# Patient Record
Sex: FEMALE | Race: WHITE | NOT HISPANIC OR LATINO | Employment: FULL TIME | URBAN - METROPOLITAN AREA
[De-identification: names, ages, dates, MRNs, and addresses within clinical notes are randomized per-mention and may not be internally consistent; named-entity substitution may affect disease eponyms.]

---

## 2017-02-14 ENCOUNTER — HOSPITAL ENCOUNTER (EMERGENCY)
Facility: CLINIC | Age: 47
Discharge: HOME | End: 2017-02-14
Attending: EMERGENCY MEDICINE | Admitting: EMERGENCY MEDICINE
Payer: COMMERCIAL

## 2017-02-14 VITALS
RESPIRATION RATE: 18 BRPM | HEART RATE: 76 BPM | DIASTOLIC BLOOD PRESSURE: 98 MMHG | SYSTOLIC BLOOD PRESSURE: 137 MMHG | OXYGEN SATURATION: 95 %

## 2017-02-14 DIAGNOSIS — S61.011A LACERATION OF RIGHT THUMB, INITIAL ENCOUNTER: ICD-10-CM

## 2017-02-14 PROCEDURE — 12001 RPR S/N/AX/GEN/TRNK 2.5CM/<: CPT

## 2017-02-14 PROCEDURE — 99283 EMERGENCY DEPT VISIT LOW MDM: CPT

## 2017-02-14 ASSESSMENT — ENCOUNTER SYMPTOMS: WOUND: 1

## 2017-02-14 NOTE — ED AVS SNAPSHOT
Emergency Department    [address] [facility] [address]    Phone:  [phone]    Fax:  [phone]                                       Debi Burciaga   MRN: [id number]    Department:   Emergency Department   Date of Visit:  2/14/2017           Patient Information     Date Of Birth          1970        Your diagnoses for this visit were:     Laceration of right thumb, initial encounter        You were seen by Cindy Narayanan MD.      Follow-up Information     Follow up with  Emergency Department.    Specialty:  EMERGENCY MEDICINE    Why:  or your Primary clinic for suture removal in 8-10 days.    Contact information:    [address] [facility] [address] [phone]        Discharge Instructions       Have stitches removed in 8-10 days.    Discharge References/Attachments     LACERATION, EXTREMITY: SUTURE, STAPLE, OR TAPE (ENGLISH)      24 Hour Appointment Hotline       To make an appointment at any [facility], call [phone] ([phone]). If you don't have a family doctor or clinic, we will help you find one. [facility] clinics are conveniently located to serve the needs of you and your family.             Review of your medicines      Our records show that you are taking the medicines listed below. If these are incorrect, please call your family doctor or clinic.        Dose / Directions Last dose taken    AMLODIPINE BESYLATE PO   Dose:  5 mg        Take 5 mg by mouth every morning.   Refills:  0        HYDROCHLOROTHIAZIDE PO   Dose:  25 mg        Take 25 mg by mouth every morning.   Refills:  0        * ibuprofen 600 MG tablet   Commonly known as:  ADVIL/MOTRIN   Dose:  600 mg   Quantity:  30 tablet        Take 1 tablet by mouth every 6 hours as needed for other (For mild pain and temperature greater than 102F).   Refills:  0        * ibuprofen 600 MG tablet   Commonly known as:  ADVIL/MOTRIN   Dose:  600 mg   Quantity:  30 tablet         Take 1 tablet by mouth every 6 hours as needed for other (For mild pain or temperature greater than 102F).   Refills:  0        Krill Oil 1000 MG Caps   Dose:  1000 mg        Take 1,000 mg by mouth daily.   Refills:  0        Multi-vitamin Tabs tablet   Dose:  1 tablet   Generic drug:  multivitamin, therapeutic with minerals        Take 1 tablet by mouth daily.   Refills:  0        ondansetron 4 MG tablet   Commonly known as:  ZOFRAN   Dose:  4 mg   Quantity:  10 tablet        Take 1 tablet by mouth every 6 hours as needed for nausea.   Refills:  1        oxyCODONE 5 MG IR tablet   Commonly known as:  ROXICODONE   Dose:  5-10 mg   Quantity:  20 tablet        Take 1-2 tablets by mouth every 3 hours as needed for other (Moderate to Severe Pain).   Refills:  0        TRAMADOL HCL PO   Dose:  50 mg        Take 50 mg by mouth every 6 hours as needed. Actually takes 50 mg once a day as needed.   Refills:  0        VALTREX PO   Dose:  2000 mg        Take 2,000 mg by mouth 2 times daily as needed.   Refills:  0        * Notice:  This list has 2 medication(s) that are the same as other medications prescribed for you. Read the directions carefully, and ask your doctor or other care provider to review them with you.            Orders Needing Specimen Collection     None      Pending Results     No orders found from 2/12/2017 to 2/15/2017.            Pending Culture Results     No orders found from 2/12/2017 to 2/15/2017.             Test Results from your hospital stay            Clinical Quality Measure: Blood Pressure Screening     Your blood pressure was checked while you were in the emergency department today. The last reading we obtained was  BP: (!) 137/98 . Please read the guidelines below about what these numbers mean and what you should do about them.  If your systolic blood pressure (the top number) is less than 120 and your diastolic blood pressure (the bottom number) is less than 80, then your blood pressure is  "normal. There is nothing more that you need to do about it.  If your systolic blood pressure (the top number) is 120-139 or your diastolic blood pressure (the bottom number) is 80-89, your blood pressure may be higher than it should be. You should have your blood pressure rechecked within a year by a primary care provider.  If your systolic blood pressure (the top number) is 140 or greater or your diastolic blood pressure (the bottom number) is 90 or greater, you may have high blood pressure. High blood pressure is treatable, but if left untreated over time it can put you at risk for heart attack, stroke, or kidney failure. You should have your blood pressure rechecked by a primary care provider within the next 4 weeks.  If your provider in the emergency department today gave you specific instructions to follow-up with your doctor or provider even sooner than that, you should follow that instruction and not wait for up to 4 weeks for your follow-up visit.        Thank you for choosing [facility]       Thank you for choosing [facility] for your care. Our goal is always to provide you with excellent care. Hearing back from our patients is one way we can continue to improve our services. Please take a few minutes to complete the written survey that you may receive in the mail after you visit with us. Thank you!        [employer] Information     [employer] lets you send messages to your doctor, view your test results, renew your prescriptions, schedule appointments and more. To sign up, go to [email or web address] . Click on \"Log in\" on the left side of the screen, which will take you to the Welcome page. Then click on \"Sign up Now\" on the right side of the page.     You will be asked to enter the access code listed below, as well as some personal information. Please follow the directions to create your username and password.     Your access code is: [email or web address]  Expires: 5/15/2017  7:47 PM     Your access code will  in " 90 days. If you need help or a new code, please call your [facility] clinic or [phone].        Care EveryWhere ID     This is your Care EveryWhere ID. This could be used by other organizations to access your [facility] medical records  [id number]        After Visit Summary       This is your record. Keep this with you and show to your community pharmacist(s) and doctor(s) at your next visit.

## 2017-02-14 NOTE — ED AVS SNAPSHOT
Emergency Department    [address] [facility] [address]    Phone:  [phone]    Fax:  [phone]                                       Debi Burciaga   MRN: [id number]    Department:   Emergency Department   Date of Visit:  2/14/2017           After Visit Summary Signature Page     I have received my discharge instructions, and my questions have been answered. I have discussed any challenges I see with this plan with the nurse or doctor.    ..........................................................................................................................................  Patient/Patient Representative Signature      ..........................................................................................................................................  Patient Representative Print Name and Relationship to Patient    ..................................................               ................................................  Date                                            Time    ..........................................................................................................................................  Reviewed by Signature/Title    ...................................................              ..............................................  Date                                                            Time

## 2017-02-15 NOTE — ED PROVIDER NOTES
History     Chief Complaint:  Hand injury    HPI   Debi Burciaga is a 47 year old female who presents with a right hand injury. The patient was cooking dinner and cut her right first digit on a Mandolin, and a significant amount of skin was removed. Her Tetanus was updated 4/15/16.     Allergies:  Latex, rash     Medications:    Ibuprofen  Oxycodone  Zofran  Tramadol  Valtrex  Amlodipine Besylate  Hydrochlorothiazide     Past Medical History:    Abnormal pap smear of cervix  Alopecia  HTN  Menorrhagia    Past Surgical History:    Colposcopy, cryo, vacuum evacuation  Laparoscopic tubal ligation  D & C  Laparoscopic hysterectomy supracervical    Family History:    No past pertinent family history.    Social History:  Former Smoker, Quit Date: 3/22/93  Positive for alcohol use, 2-3/ week  Marital Status:   [2]     Review of Systems   Skin: Positive for wound.   All other systems reviewed and are negative.      Physical Exam   First Vitals:  BP: (!) 137/98  Pulse: 76  Resp: 18  SpO2: 95 %      Physical Exam  Nursing note and vitals reviewed.  Constitutional:  Appears well-developed and well-nourished.   HENT:   Head:    Atraumatic.   Mouth/Throat:   Oropharynx is clear and moist. No oropharyngeal exudate.   Eyes:    Pupils are equal, round, and reactive to light.   Neck:    Normal range of motion. Neck supple.      No tracheal deviation present. No thyromegaly present.   Cardiovascular:  Normal rate, regular rhythm, no murmur   Pulmonary/Chest: Breath sounds are clear and equal without wheezes or crackles.  Abdominal:   Soft. Bowel sounds are normal. Exhibits no distension and      no mass. There is no tenderness.      There is no rebound and no guarding.   Musculoskeletal:  Exhibits no edema.   Lymphadenopathy:  No cervical adenopathy.   Neurological:   Alert and oriented to person, place, and time.   Skin:    Skin is warm and dry. No rash noted. No pallor. 2 x 1 cm laceration of missing skin to the  lateral aspect of distal phalanx of right thumb with active oozing bleeding      Emergency Department Course   Procedures:     Laceration Repair      LACERATION:  A simple clean 2 x 1 cm laceration of missing skin    LOCATION:  Lateral aspect of distal phalanx of right thumb with active oozing bleeding    FUNCTION:  Distally sensation, circulation, motor and tendon function are intact.    ANESTHESIA:  Digital block using 1% Lidocaine and 0.5% Marcaine total of 3 cc    PREPARATION:  Irrigation and Scrubbing with Normal Saline.    DEBRIDEMENT:  no debridement.    CLOSURE:  Wound was closed with One Layer.  Skin closed with 3 x 5.0 Nylon using interrupted sutures..      Emergency Department Course:  Nursing notes and vitals reviewed. I performed an exam of the patient as documented above.     The above workup was undertaken.    1852 I numbed the patient before repair of her laceration.    1925 I preformed the laceration repair as noted above.    Findings and plan explained to the patient. Patient discharged home with instructions regarding supportive care, medications, and reasons to return. The importance of close follow-up was reviewed.       Impression & Plan    Medical Decision Making:  Debi Burciaga is a 47 year old female who presents for evaluation of a laceration to the lateral aspect of distal phalanx of right thumb with active oozing bleeding.  The wound was carefully evaluated and explored.  The laceration was closed with sutures as noted above.  There is no evidence of muscular, tendon, or bony damage with this laceration.  No signs of foreign body.  Possible complications (infection, scarring) were reviewed with the patient.  Follow up with primary care as noted in the discharge section.      Diagnosis:    ICD-10-CM    1. Laceration of right thumb, initial encounter S61.011A        Disposition:  Discharged to home    Leslie LANDERS am serving as a scribe on 2/14/2017 at 6:48 PM to personally  document services performed by Cindy Narayanan MD based on my observations and the provider's statements to me.     Leslie Arana  2/14/2017    EMERGENCY DEPARTMENT       Cindy Narayanan MD  02/14/17 3735

## 2019-07-29 ENCOUNTER — ANESTHESIA (OUTPATIENT)
Dept: SURGERY | Facility: CLINIC | Age: 49
End: 2019-07-29
Payer: COMMERCIAL

## 2019-07-29 ENCOUNTER — APPOINTMENT (OUTPATIENT)
Dept: ULTRASOUND IMAGING | Facility: CLINIC | Age: 49
End: 2019-07-29
Attending: EMERGENCY MEDICINE
Payer: COMMERCIAL

## 2019-07-29 ENCOUNTER — SURGERY (OUTPATIENT)
Age: 49
End: 2019-07-29
Payer: COMMERCIAL

## 2019-07-29 ENCOUNTER — HOSPITAL ENCOUNTER (OUTPATIENT)
Facility: CLINIC | Age: 49
Discharge: HOME | End: 2019-07-30
Attending: EMERGENCY MEDICINE | Admitting: OBSTETRICS & GYNECOLOGY
Payer: COMMERCIAL

## 2019-07-29 ENCOUNTER — APPOINTMENT (OUTPATIENT)
Dept: CT IMAGING | Facility: CLINIC | Age: 49
End: 2019-07-29
Attending: EMERGENCY MEDICINE
Payer: COMMERCIAL

## 2019-07-29 ENCOUNTER — ANESTHESIA EVENT (OUTPATIENT)
Dept: SURGERY | Facility: CLINIC | Age: 49
End: 2019-07-29
Payer: COMMERCIAL

## 2019-07-29 DIAGNOSIS — N83.519 OVARIAN TORSION: ICD-10-CM

## 2019-07-29 DIAGNOSIS — Z90.721 S/P LEFT OOPHORECTOMY: Primary | ICD-10-CM

## 2019-07-29 DIAGNOSIS — N83.202 CYST OF LEFT OVARY: ICD-10-CM

## 2019-07-29 LAB
ALBUMIN SERPL-MCNC: 4 G/DL (ref 3.4–5)
ALBUMIN UR-MCNC: 10 MG/DL
ALP SERPL-CCNC: 98 U/L (ref 40–150)
ALT SERPL W P-5'-P-CCNC: 21 U/L (ref 0–50)
AMORPH CRY #/AREA URNS HPF: ABNORMAL /HPF
ANION GAP SERPL CALCULATED.3IONS-SCNC: 4 MMOL/L (ref 3–14)
APPEARANCE UR: ABNORMAL
AST SERPL W P-5'-P-CCNC: 15 U/L (ref 0–45)
BASOPHILS # BLD AUTO: 0 10E9/L (ref 0–0.2)
BASOPHILS NFR BLD AUTO: 0.6 %
BILIRUB SERPL-MCNC: 0.7 MG/DL (ref 0.2–1.3)
BILIRUB UR QL STRIP: NEGATIVE
BUN SERPL-MCNC: 16 MG/DL (ref 7–30)
CALCIUM SERPL-MCNC: 9.1 MG/DL (ref 8.5–10.1)
CAOX CRY #/AREA URNS HPF: ABNORMAL /HPF
CHLORIDE SERPL-SCNC: 105 MMOL/L (ref 94–109)
CO2 SERPL-SCNC: 30 MMOL/L (ref 20–32)
COLOR UR AUTO: YELLOW
CREAT SERPL-MCNC: 0.6 MG/DL (ref 0.52–1.04)
DIFFERENTIAL METHOD BLD: ABNORMAL
EOSINOPHIL # BLD AUTO: 0.2 10E9/L (ref 0–0.7)
EOSINOPHIL NFR BLD AUTO: 2.9 %
ERYTHROCYTE [DISTWIDTH] IN BLOOD BY AUTOMATED COUNT: 12.2 % (ref 10–15)
GFR SERPL CREATININE-BSD FRML MDRD: >90 ML/MIN/{1.73_M2}
GLUCOSE SERPL-MCNC: 87 MG/DL (ref 70–99)
GLUCOSE UR STRIP-MCNC: NEGATIVE MG/DL
HCT VFR BLD AUTO: 44.1 % (ref 35–47)
HGB BLD-MCNC: 15.9 G/DL (ref 11.7–15.7)
HGB UR QL STRIP: NEGATIVE
IMM GRANULOCYTES # BLD: 0 10E9/L (ref 0–0.4)
IMM GRANULOCYTES NFR BLD: 0.2 %
KETONES UR STRIP-MCNC: NEGATIVE MG/DL
LEUKOCYTE ESTERASE UR QL STRIP: ABNORMAL
LYMPHOCYTES # BLD AUTO: 1.3 10E9/L (ref 0.8–5.3)
LYMPHOCYTES NFR BLD AUTO: 21 %
MCH RBC QN AUTO: 30.6 PG (ref 26.5–33)
MCHC RBC AUTO-ENTMCNC: 36.1 G/DL (ref 31.5–36.5)
MCV RBC AUTO: 85 FL (ref 78–100)
MONOCYTES # BLD AUTO: 0.6 10E9/L (ref 0–1.3)
MONOCYTES NFR BLD AUTO: 8.7 %
MUCOUS THREADS #/AREA URNS LPF: PRESENT /LPF
NEUTROPHILS # BLD AUTO: 4.2 10E9/L (ref 1.6–8.3)
NEUTROPHILS NFR BLD AUTO: 66.6 %
NITRATE UR QL: NEGATIVE
NRBC # BLD AUTO: 0 10*3/UL
NRBC BLD AUTO-RTO: 0 /100
PH UR STRIP: 7 PH (ref 5–7)
PLATELET # BLD AUTO: 266 10E9/L (ref 150–450)
POTASSIUM SERPL-SCNC: 3.6 MMOL/L (ref 3.4–5.3)
PROT SERPL-MCNC: 7.5 G/DL (ref 6.8–8.8)
RBC # BLD AUTO: 5.19 10E12/L (ref 3.8–5.2)
RBC #/AREA URNS AUTO: 0 /HPF (ref 0–2)
SODIUM SERPL-SCNC: 139 MMOL/L (ref 133–144)
SOURCE: ABNORMAL
SP GR UR STRIP: 1.02 (ref 1–1.03)
SQUAMOUS #/AREA URNS AUTO: 10 /HPF (ref 0–1)
UROBILINOGEN UR STRIP-MCNC: NORMAL MG/DL (ref 0–2)
WBC # BLD AUTO: 6.3 10E9/L (ref 4–11)
WBC #/AREA URNS AUTO: 4 /HPF (ref 0–5)

## 2019-07-29 PROCEDURE — 37000008 ZZH ANESTHESIA TECHNICAL FEE, 1ST 30 MIN: Performed by: OBSTETRICS & GYNECOLOGY

## 2019-07-29 PROCEDURE — 25000132 ZZH RX MED GY IP 250 OP 250 PS 637: Performed by: OBSTETRICS & GYNECOLOGY

## 2019-07-29 PROCEDURE — 99285 EMERGENCY DEPT VISIT HI MDM: CPT | Mod: 25

## 2019-07-29 PROCEDURE — 40000170 ZZH STATISTIC PRE-PROCEDURE ASSESSMENT II: Performed by: OBSTETRICS & GYNECOLOGY

## 2019-07-29 PROCEDURE — 25000128 H RX IP 250 OP 636: Performed by: NURSE ANESTHETIST, CERTIFIED REGISTERED

## 2019-07-29 PROCEDURE — 40000936 ZZH STATISTIC OUTPATIENT (NON-OBS) NIGHT

## 2019-07-29 PROCEDURE — 96374 THER/PROPH/DIAG INJ IV PUSH: CPT | Mod: 59

## 2019-07-29 PROCEDURE — 88307 TISSUE EXAM BY PATHOLOGIST: CPT | Mod: 26 | Performed by: OBSTETRICS & GYNECOLOGY

## 2019-07-29 PROCEDURE — 25000128 H RX IP 250 OP 636

## 2019-07-29 PROCEDURE — 71000013 ZZH RECOVERY PHASE 1 LEVEL 1 EA ADDTL HR: Performed by: OBSTETRICS & GYNECOLOGY

## 2019-07-29 PROCEDURE — 25800030 ZZH RX IP 258 OP 636: Performed by: OBSTETRICS & GYNECOLOGY

## 2019-07-29 PROCEDURE — 36000058 ZZH SURGERY LEVEL 3 EA 15 ADDTL MIN: Performed by: OBSTETRICS & GYNECOLOGY

## 2019-07-29 PROCEDURE — 25000125 ZZHC RX 250: Performed by: NURSE ANESTHETIST, CERTIFIED REGISTERED

## 2019-07-29 PROCEDURE — 37000009 ZZH ANESTHESIA TECHNICAL FEE, EACH ADDTL 15 MIN: Performed by: OBSTETRICS & GYNECOLOGY

## 2019-07-29 PROCEDURE — 88307 TISSUE EXAM BY PATHOLOGIST: CPT | Performed by: OBSTETRICS & GYNECOLOGY

## 2019-07-29 PROCEDURE — 80053 COMPREHEN METABOLIC PANEL: CPT | Performed by: EMERGENCY MEDICINE

## 2019-07-29 PROCEDURE — 81001 URINALYSIS AUTO W/SCOPE: CPT | Performed by: EMERGENCY MEDICINE

## 2019-07-29 PROCEDURE — 25000128 H RX IP 250 OP 636: Performed by: EMERGENCY MEDICINE

## 2019-07-29 PROCEDURE — 85025 COMPLETE CBC W/AUTO DIFF WBC: CPT | Performed by: EMERGENCY MEDICINE

## 2019-07-29 PROCEDURE — 93976 VASCULAR STUDY: CPT

## 2019-07-29 PROCEDURE — 27210794 ZZH OR GENERAL SUPPLY STERILE: Performed by: OBSTETRICS & GYNECOLOGY

## 2019-07-29 PROCEDURE — 74176 CT ABD & PELVIS W/O CONTRAST: CPT

## 2019-07-29 PROCEDURE — 25000132 ZZH RX MED GY IP 250 OP 250 PS 637: Performed by: EMERGENCY MEDICINE

## 2019-07-29 PROCEDURE — 40000935 ZZH STATISTIC OUTPATIENT (NON-OBS) EVE

## 2019-07-29 PROCEDURE — 25000128 H RX IP 250 OP 636: Performed by: OBSTETRICS & GYNECOLOGY

## 2019-07-29 PROCEDURE — 36000056 ZZH SURGERY LEVEL 3 1ST 30 MIN: Performed by: OBSTETRICS & GYNECOLOGY

## 2019-07-29 PROCEDURE — 71000012 ZZH RECOVERY PHASE 1 LEVEL 1 FIRST HR: Performed by: OBSTETRICS & GYNECOLOGY

## 2019-07-29 PROCEDURE — 25000566 ZZH SEVOFLURANE, EA 15 MIN: Performed by: OBSTETRICS & GYNECOLOGY

## 2019-07-29 RX ORDER — SPIRONOLACTONE 25 MG/1
25 TABLET ORAL DAILY
COMMUNITY
Start: 2017-01-22

## 2019-07-29 RX ORDER — DEXAMETHASONE SODIUM PHOSPHATE 4 MG/ML
4 INJECTION, SOLUTION INTRA-ARTICULAR; INTRALESIONAL; INTRAMUSCULAR; INTRAVENOUS; SOFT TISSUE
Status: DISCONTINUED | OUTPATIENT
Start: 2019-07-29 | End: 2019-07-29 | Stop reason: HOSPADM

## 2019-07-29 RX ORDER — IBUPROFEN 600 MG/1
600 TABLET, FILM COATED ORAL
Status: DISCONTINUED | OUTPATIENT
Start: 2019-07-29 | End: 2019-07-30 | Stop reason: HOSPADM

## 2019-07-29 RX ORDER — FENTANYL CITRATE 50 UG/ML
INJECTION, SOLUTION INTRAMUSCULAR; INTRAVENOUS PRN
Status: DISCONTINUED | OUTPATIENT
Start: 2019-07-29 | End: 2019-07-29

## 2019-07-29 RX ORDER — IBUPROFEN 600 MG/1
600 TABLET, FILM COATED ORAL ONCE
Status: COMPLETED | OUTPATIENT
Start: 2019-07-29 | End: 2019-07-29

## 2019-07-29 RX ORDER — DEXAMETHASONE SODIUM PHOSPHATE 4 MG/ML
INJECTION, SOLUTION INTRA-ARTICULAR; INTRALESIONAL; INTRAMUSCULAR; INTRAVENOUS; SOFT TISSUE PRN
Status: DISCONTINUED | OUTPATIENT
Start: 2019-07-29 | End: 2019-07-29

## 2019-07-29 RX ORDER — PROPOFOL 10 MG/ML
INJECTION, EMULSION INTRAVENOUS PRN
Status: DISCONTINUED | OUTPATIENT
Start: 2019-07-29 | End: 2019-07-29

## 2019-07-29 RX ORDER — IBUPROFEN 200 MG
400 TABLET ORAL EVERY 4 HOURS PRN
COMMUNITY
End: 2021-10-12

## 2019-07-29 RX ORDER — HYDROCHLOROTHIAZIDE 25 MG/1
50 TABLET ORAL DAILY
COMMUNITY

## 2019-07-29 RX ORDER — LABETALOL HYDROCHLORIDE 5 MG/ML
10 INJECTION, SOLUTION INTRAVENOUS
Status: DISCONTINUED | OUTPATIENT
Start: 2019-07-29 | End: 2019-07-29 | Stop reason: HOSPADM

## 2019-07-29 RX ORDER — NALOXONE HYDROCHLORIDE 0.4 MG/ML
.1-.4 INJECTION, SOLUTION INTRAMUSCULAR; INTRAVENOUS; SUBCUTANEOUS
Status: DISCONTINUED | OUTPATIENT
Start: 2019-07-29 | End: 2019-07-30 | Stop reason: HOSPADM

## 2019-07-29 RX ORDER — SODIUM CHLORIDE, SODIUM LACTATE, POTASSIUM CHLORIDE, CALCIUM CHLORIDE 600; 310; 30; 20 MG/100ML; MG/100ML; MG/100ML; MG/100ML
INJECTION, SOLUTION INTRAVENOUS CONTINUOUS
Status: DISCONTINUED | OUTPATIENT
Start: 2019-07-29 | End: 2019-07-30 | Stop reason: HOSPADM

## 2019-07-29 RX ORDER — HYDRALAZINE HYDROCHLORIDE 20 MG/ML
2.5-5 INJECTION INTRAMUSCULAR; INTRAVENOUS EVERY 10 MIN PRN
Status: DISCONTINUED | OUTPATIENT
Start: 2019-07-29 | End: 2019-07-29 | Stop reason: HOSPADM

## 2019-07-29 RX ORDER — OXYCODONE HYDROCHLORIDE 5 MG/1
5 TABLET ORAL
Status: DISCONTINUED | OUTPATIENT
Start: 2019-07-29 | End: 2019-07-30 | Stop reason: HOSPADM

## 2019-07-29 RX ORDER — NEOSTIGMINE METHYLSULFATE 1 MG/ML
VIAL (ML) INJECTION PRN
Status: DISCONTINUED | OUTPATIENT
Start: 2019-07-29 | End: 2019-07-29

## 2019-07-29 RX ORDER — SODIUM CHLORIDE, SODIUM LACTATE, POTASSIUM CHLORIDE, CALCIUM CHLORIDE 600; 310; 30; 20 MG/100ML; MG/100ML; MG/100ML; MG/100ML
INJECTION, SOLUTION INTRAVENOUS CONTINUOUS
Status: DISCONTINUED | OUTPATIENT
Start: 2019-07-29 | End: 2019-07-29 | Stop reason: HOSPADM

## 2019-07-29 RX ORDER — ONDANSETRON 2 MG/ML
4 INJECTION INTRAMUSCULAR; INTRAVENOUS EVERY 30 MIN PRN
Status: DISCONTINUED | OUTPATIENT
Start: 2019-07-29 | End: 2019-07-29 | Stop reason: HOSPADM

## 2019-07-29 RX ORDER — IBUPROFEN 600 MG/1
600 TABLET, FILM COATED ORAL
Status: DISCONTINUED | OUTPATIENT
Start: 2019-07-29 | End: 2019-07-29

## 2019-07-29 RX ORDER — ONDANSETRON 4 MG/1
4 TABLET, ORALLY DISINTEGRATING ORAL
Status: DISCONTINUED | OUTPATIENT
Start: 2019-07-29 | End: 2019-07-29

## 2019-07-29 RX ORDER — HYDROMORPHONE HYDROCHLORIDE 1 MG/ML
0.2 INJECTION, SOLUTION INTRAMUSCULAR; INTRAVENOUS; SUBCUTANEOUS
Status: DISCONTINUED | OUTPATIENT
Start: 2019-07-29 | End: 2019-07-30 | Stop reason: HOSPADM

## 2019-07-29 RX ORDER — ONDANSETRON 2 MG/ML
4 INJECTION INTRAMUSCULAR; INTRAVENOUS EVERY 6 HOURS PRN
Status: DISCONTINUED | OUTPATIENT
Start: 2019-07-29 | End: 2019-07-30 | Stop reason: HOSPADM

## 2019-07-29 RX ORDER — ONDANSETRON 2 MG/ML
INJECTION INTRAMUSCULAR; INTRAVENOUS
Status: COMPLETED
Start: 2019-07-29 | End: 2019-07-29

## 2019-07-29 RX ORDER — OXYCODONE HYDROCHLORIDE 5 MG/1
5 TABLET ORAL
Status: DISCONTINUED | OUTPATIENT
Start: 2019-07-29 | End: 2019-07-29

## 2019-07-29 RX ORDER — ONDANSETRON 2 MG/ML
4 INJECTION INTRAMUSCULAR; INTRAVENOUS EVERY 6 HOURS PRN
Status: DISCONTINUED | OUTPATIENT
Start: 2019-07-29 | End: 2019-07-29

## 2019-07-29 RX ORDER — NALOXONE HYDROCHLORIDE 0.4 MG/ML
.1-.4 INJECTION, SOLUTION INTRAMUSCULAR; INTRAVENOUS; SUBCUTANEOUS
Status: DISCONTINUED | OUTPATIENT
Start: 2019-07-29 | End: 2019-07-29

## 2019-07-29 RX ORDER — LIDOCAINE HYDROCHLORIDE 20 MG/ML
INJECTION, SOLUTION INFILTRATION; PERINEURAL PRN
Status: DISCONTINUED | OUTPATIENT
Start: 2019-07-29 | End: 2019-07-29

## 2019-07-29 RX ORDER — EPHEDRINE SULFATE 50 MG/ML
INJECTION, SOLUTION INTRAMUSCULAR; INTRAVENOUS; SUBCUTANEOUS PRN
Status: DISCONTINUED | OUTPATIENT
Start: 2019-07-29 | End: 2019-07-29

## 2019-07-29 RX ORDER — ACETAMINOPHEN 325 MG/1
975 TABLET ORAL
Status: DISCONTINUED | OUTPATIENT
Start: 2019-07-29 | End: 2019-07-30 | Stop reason: HOSPADM

## 2019-07-29 RX ORDER — ONDANSETRON 2 MG/ML
INJECTION INTRAMUSCULAR; INTRAVENOUS PRN
Status: DISCONTINUED | OUTPATIENT
Start: 2019-07-29 | End: 2019-07-29

## 2019-07-29 RX ORDER — HYDROMORPHONE HYDROCHLORIDE 1 MG/ML
.3-.5 INJECTION, SOLUTION INTRAMUSCULAR; INTRAVENOUS; SUBCUTANEOUS EVERY 5 MIN PRN
Status: DISCONTINUED | OUTPATIENT
Start: 2019-07-29 | End: 2019-07-29 | Stop reason: HOSPADM

## 2019-07-29 RX ORDER — CEFAZOLIN SODIUM 1 G/3ML
INJECTION, POWDER, FOR SOLUTION INTRAMUSCULAR; INTRAVENOUS PRN
Status: DISCONTINUED | OUTPATIENT
Start: 2019-07-29 | End: 2019-07-29

## 2019-07-29 RX ORDER — FENTANYL CITRATE 50 UG/ML
25-50 INJECTION, SOLUTION INTRAMUSCULAR; INTRAVENOUS
Status: DISCONTINUED | OUTPATIENT
Start: 2019-07-29 | End: 2019-07-29 | Stop reason: HOSPADM

## 2019-07-29 RX ORDER — GLYCOPYRROLATE 0.2 MG/ML
INJECTION, SOLUTION INTRAMUSCULAR; INTRAVENOUS PRN
Status: DISCONTINUED | OUTPATIENT
Start: 2019-07-29 | End: 2019-07-29

## 2019-07-29 RX ORDER — HYDROMORPHONE HYDROCHLORIDE 1 MG/ML
0.5 INJECTION, SOLUTION INTRAMUSCULAR; INTRAVENOUS; SUBCUTANEOUS ONCE
Status: COMPLETED | OUTPATIENT
Start: 2019-07-29 | End: 2019-07-29

## 2019-07-29 RX ORDER — IBUPROFEN 200 MG
600 TABLET ORAL EVERY 6 HOURS PRN
COMMUNITY
Start: 2019-07-29

## 2019-07-29 RX ORDER — ONDANSETRON 4 MG/1
4 TABLET, ORALLY DISINTEGRATING ORAL EVERY 30 MIN PRN
Status: DISCONTINUED | OUTPATIENT
Start: 2019-07-29 | End: 2019-07-29 | Stop reason: HOSPADM

## 2019-07-29 RX ORDER — KETOROLAC TROMETHAMINE 30 MG/ML
30 INJECTION, SOLUTION INTRAMUSCULAR; INTRAVENOUS
Status: DISCONTINUED | OUTPATIENT
Start: 2019-07-29 | End: 2019-07-29 | Stop reason: HOSPADM

## 2019-07-29 RX ORDER — ONDANSETRON 4 MG/1
4 TABLET, ORALLY DISINTEGRATING ORAL EVERY 6 HOURS PRN
Status: DISCONTINUED | OUTPATIENT
Start: 2019-07-29 | End: 2019-07-30 | Stop reason: HOSPADM

## 2019-07-29 RX ADMIN — HYDROMORPHONE HYDROCHLORIDE 0.5 MG: 1 INJECTION, SOLUTION INTRAMUSCULAR; INTRAVENOUS; SUBCUTANEOUS at 11:15

## 2019-07-29 RX ADMIN — FENTANYL CITRATE 25 MCG: 50 INJECTION, SOLUTION INTRAMUSCULAR; INTRAVENOUS at 16:12

## 2019-07-29 RX ADMIN — ONDANSETRON 4 MG: 2 INJECTION INTRAMUSCULAR; INTRAVENOUS at 18:53

## 2019-07-29 RX ADMIN — CEFAZOLIN 1 G: 1 INJECTION, POWDER, FOR SOLUTION INTRAMUSCULAR; INTRAVENOUS at 16:21

## 2019-07-29 RX ADMIN — ONDANSETRON 4 MG: 2 INJECTION INTRAMUSCULAR; INTRAVENOUS at 13:58

## 2019-07-29 RX ADMIN — SODIUM CHLORIDE, POTASSIUM CHLORIDE, SODIUM LACTATE AND CALCIUM CHLORIDE: 600; 310; 30; 20 INJECTION, SOLUTION INTRAVENOUS at 13:52

## 2019-07-29 RX ADMIN — IBUPROFEN 600 MG: 600 TABLET ORAL at 06:55

## 2019-07-29 RX ADMIN — DEXAMETHASONE SODIUM PHOSPHATE 4 MG: 4 INJECTION, SOLUTION INTRA-ARTICULAR; INTRALESIONAL; INTRAMUSCULAR; INTRAVENOUS; SOFT TISSUE at 16:35

## 2019-07-29 RX ADMIN — LIDOCAINE HYDROCHLORIDE 80 MG: 20 INJECTION, SOLUTION INFILTRATION; PERINEURAL at 16:12

## 2019-07-29 RX ADMIN — SODIUM CHLORIDE, POTASSIUM CHLORIDE, SODIUM LACTATE AND CALCIUM CHLORIDE: 600; 310; 30; 20 INJECTION, SOLUTION INTRAVENOUS at 20:28

## 2019-07-29 RX ADMIN — SODIUM CHLORIDE, POTASSIUM CHLORIDE, SODIUM LACTATE AND CALCIUM CHLORIDE: 600; 310; 30; 20 INJECTION, SOLUTION INTRAVENOUS at 18:41

## 2019-07-29 RX ADMIN — FENTANYL CITRATE 50 MCG: 50 INJECTION, SOLUTION INTRAMUSCULAR; INTRAVENOUS at 16:40

## 2019-07-29 RX ADMIN — GLYCOPYRROLATE 0.6 MG: 0.2 INJECTION, SOLUTION INTRAMUSCULAR; INTRAVENOUS at 16:55

## 2019-07-29 RX ADMIN — ROCURONIUM BROMIDE 40 MG: 10 INJECTION INTRAVENOUS at 16:14

## 2019-07-29 RX ADMIN — ONDANSETRON 4 MG: 2 INJECTION INTRAMUSCULAR; INTRAVENOUS at 16:52

## 2019-07-29 RX ADMIN — HYDROMORPHONE HYDROCHLORIDE 0.2 MG: 1 INJECTION, SOLUTION INTRAMUSCULAR; INTRAVENOUS; SUBCUTANEOUS at 14:31

## 2019-07-29 RX ADMIN — FENTANYL CITRATE 25 MCG: 50 INJECTION, SOLUTION INTRAMUSCULAR; INTRAVENOUS at 16:28

## 2019-07-29 RX ADMIN — Medication 5 MG: at 16:34

## 2019-07-29 RX ADMIN — ROCURONIUM BROMIDE 10 MG: 10 INJECTION INTRAVENOUS at 16:34

## 2019-07-29 RX ADMIN — HYDROMORPHONE HYDROCHLORIDE 0.2 MG: 1 INJECTION, SOLUTION INTRAMUSCULAR; INTRAVENOUS; SUBCUTANEOUS at 18:39

## 2019-07-29 RX ADMIN — MIDAZOLAM 2 MG: 1 INJECTION INTRAMUSCULAR; INTRAVENOUS at 16:08

## 2019-07-29 RX ADMIN — IBUPROFEN 600 MG: 600 TABLET ORAL at 22:00

## 2019-07-29 RX ADMIN — NEOSTIGMINE METHYLSULFATE 4 MG: 1 INJECTION, SOLUTION INTRAVENOUS at 16:55

## 2019-07-29 RX ADMIN — PROPOFOL 140 MG: 10 INJECTION, EMULSION INTRAVENOUS at 16:12

## 2019-07-29 ASSESSMENT — ENCOUNTER SYMPTOMS
BACK PAIN: 1
DYSURIA: 0
VOMITING: 0
NAUSEA: 1
HEMATURIA: 0
FEVER: 0
SHORTNESS OF BREATH: 0
FLANK PAIN: 1

## 2019-07-29 ASSESSMENT — LIFESTYLE VARIABLES: TOBACCO_USE: 1

## 2019-07-29 NOTE — ANESTHESIA PREPROCEDURE EVALUATION
Anesthesia Pre-Procedure Evaluation    Patient: Debi Burciaga   MRN: [id number] : 1970          Preoperative Diagnosis: LEFT FLANK PAIN.    Procedure(s):  OOPHORECTOMY, LAPAROSCOPIC    Past Medical History:   Diagnosis Date     Abnormal Pap smear of cervix      Alopecia      Hypertension      Menorrhagia      Past Surgical History:   Procedure Laterality Date     BREAST SURGERY      breast augmentation, bilateral     DILATION AND CURETTAGE, HYSTEROSCOPY, ABLATE ENDOMETRIUM NOVASURE, COMBINED  2012    Procedure:COMBINED DILATION AND CURETTAGE, HYSTEROSCOPY, ABLATE ENDOMETRIUM NOVASURE; Surgeon:LIDA DIAZ; Location:[facility]     GYN SURGERY      colposcopy;  cryo;  iud insertion;  vacuum evacuation     LAPAROSCOPIC HYSTERECTOMY SUPRACERVICAL  2013    Procedure: LAPAROSCOPIC HYSTERECTOMY SUPRACERVICAL;  LAPAROSCOPIC HYSTERECTOMY SUPRACERVICAL  right salpingectomy, cystoscopy;  Surgeon: Lida Diaz MD;  Location: [facility] OR     LAPAROSCOPIC TUBAL LIGATION  2012    Procedure:LAPAROSCOPIC TUBAL LIGATION; LAPAROSCOPIC TUBAL LIGATION, HYSTEROSCOPY, DILITATIONA AND CURETTAGE  WITH NOVASURE ENDOMETRIAL ABLATION; Surgeon:LIDA DIAZ; Location:[facility]       Anesthesia Evaluation     . Pt has had prior anesthetic.     No history of anesthetic complications          ROS/MED HX    ENT/Pulmonary:     (+)tobacco use, Past use , . .   (-) sleep apnea   Neurologic:  - neg neurologic ROS     Cardiovascular:     (+) hypertension----. : . . . :. .       METS/Exercise Tolerance:     Hematologic:  - neg hematologic  ROS       Musculoskeletal:         GI/Hepatic:        (-) GERD   Renal/Genitourinary:  - ROS Renal section negative       Endo:  - neg endo ROS       Psychiatric:         Infectious Disease:         Malignancy:         Other: Comment: L flank pain associated with nausea / vomiting                         Physical Exam  Normal systems:  "cardiovascular, pulmonary and dental    Airway   Mallampati: II  TM distance: >3 FB  Neck ROM: full    Dental     Cardiovascular       Pulmonary             Lab Results   Component Value Date    WBC 6.3 07/29/2019    HGB 15.9 (H) 07/29/2019    HCT 44.1 07/29/2019     07/29/2019     07/29/2019    POTASSIUM 3.6 07/29/2019    CHLORIDE 105 07/29/2019    CO2 30 07/29/2019    BUN 16 07/29/2019    CR 0.60 07/29/2019    GLC 87 07/29/2019    MAUREEN 9.1 07/29/2019    ALBUMIN 4.0 07/29/2019    PROTTOTAL 7.5 07/29/2019    ALT 21 07/29/2019    AST 15 07/29/2019    ALKPHOS 98 07/29/2019    BILITOTAL 0.7 07/29/2019    HCG Negative 04/01/2013       Preop Vitals  BP Readings from Last 3 Encounters:   07/29/19 139/79   02/14/17 (!) 137/98   04/01/13 120/76    Pulse Readings from Last 3 Encounters:   07/29/19 66   02/14/17 76      Resp Readings from Last 3 Encounters:   07/29/19 16   02/14/17 18   04/01/13 14    SpO2 Readings from Last 3 Encounters:   07/29/19 97%   02/14/17 95%   04/01/13 98%      Temp Readings from Last 1 Encounters:   07/29/19 36.3  C (97.3  F) (Oral)    Ht Readings from Last 1 Encounters:   04/01/13 1.702 m (5' 7\")      Wt Readings from Last 1 Encounters:   04/01/13 76.2 kg (168 lb)    Estimated body mass index is 26.31 kg/m  as calculated from the following:    Height as of 4/1/13: 1.702 m (5' 7\").    Weight as of 4/1/13: 76.2 kg (168 lb).       Anesthesia Plan      History & Physical Review  History and physical reviewed and following examination; no interval change.    ASA Status:  2 .    NPO Status:  > 8 hours    Plan for General and ETT with Intravenous and Propofol induction. Maintenance will be Balanced.    PONV prophylaxis:  Ondansetron (or other 5HT-3) and Dexamethasone or Solumedrol  Additional equipment: Videolaryngoscope      Postoperative Care  Postoperative pain management:  Multi-modal analgesia.      Consents  Anesthetic plan, risks, benefits and alternatives discussed with:  Patient..  "                Mayelin Soliz MD

## 2019-07-29 NOTE — PLAN OF CARE
A&O x4. VSS on RA. C/o right flank and abdomen pain 7/10. Managed with PRN Dilaudid. C/o nausea intermittent. Managed with PRN Zofran x1. NPO prior to surgical procedure this afternoon. PIV infusing LR @125ml/hr. LS clear. Denies cough. BS active, + Flatus. Voiding adequately. Report given to Alina in ricky-op. Pt transferred for surgical procedure at 1445.

## 2019-07-29 NOTE — DISCHARGE INSTRUCTIONS
Same Day Surgery Discharge Instructions for  Sedation and General Anesthesia       It's not unusual to feel dizzy, light-headed or faint for up to 24 hours after surgery or while taking pain medication.  If you have these symptoms: sit for a few minutes before standing and have someone assist you when you get up to walk or use the bathroom.      You should rest and relax for the next 24 hours. We recommend you make arrangements to have an adult stay with you for at least 24 hours after your discharge.  Avoid hazardous and strenuous activity.      DO NOT DRIVE any vehicle or operate mechanical equipment for 24 hours following the end of your surgery.  Even though you may feel normal, your reactions may be affected by the medication you have received.      Do not drink alcoholic beverages for 24 hours following surgery.       Slowly progress to your regular diet as you feel able. It's not unusual to feel nauseated and/or vomit after receiving anesthesia.  If you develop these symptoms, drink clear liquids (apple juice, ginger ale, broth, 7-up, etc. ) until you feel better.  If your nausea and vomiting persists for 24 hours, please notify your surgeon.        All narcotic pain medications, along with inactivity and anesthesia, can cause constipation. Drinking plenty of liquids and increasing fiber intake will help.      For any questions of a medical nature, call your surgeon.      Do not make important decisions for 24 hours.      If you had general anesthesia, you may have a sore throat for a couple of days related to the breathing tube used during surgery.  You may use Cepacol lozenges to help with this discomfort.  If it worsens or if you develop a fever, contact your surgeon.       If you feel your pain is not well managed with the pain medications prescribed by your surgeon, please contact your surgeon's office to let them know so they can address your concerns.               HOME CARE FOLLOWING  LAPAROSCOPY    Diet  You have no restrictions on your diet.  During the evening following surgery, drink plenty of fluids and eat a light supper.    Nausea  The anesthesia may produce some nausea.  If you feel nauseated, stay in bed and try drinking fluids such as 7-Up, tea, or soup.    Discomfort  The amount of discomfort you can expect is very unpredictable.  If you have pain that cannot be controlled with Tylenol or with the prescription you may have received, you should notify your physician.  The following complaints are not uncommon and should not be cause for concern:   1.  Abdominal tenderness; abdominal cramping   2.  Low backache or pain radiating to your shoulders, chest or back. This is a result of the gas used to inflate your abdomen during surgery. Lying flat in bed seems to help relieve this.   3.  Sore throat for a day or two resulting from the anesthesia tube used during surgery.   4.  Black or blue  on your abdomen.     Drainage  You may expect a small amount of drainage from the incision on your abdomen and you may change the bandage when necessary.  You will also have a small amount of vaginal drainage for several days; this is normal and no cause for concern.  If excessive bleeding occurs, notify your physician.      If dye was used during your procedure, your urine will initially be bright blue. It will gradually return to yellow throughout the day. Drinking plenty of fluids will help to filter the dye from your urine.    Fever  A low grade fever (not over 100 F) is usual after this procedure.  Do not hesitate to notify your physician if your fever seems excessive.    Stitches  If your stitches are the type that must be removed, your doctor will instruct you to return to their office.    Activity  Rest on the day of surgery then you may resume your normal activity, as tolerated. Avoid heavy lifting for one week.    You may shower.  Do not douche, and do not use tampons.  If you also had a  D&C, do not resume intercourse until bleeding has ceased.            Emergency Care  Contact your physician if you have any of these problems:   1.  A fever over 100 F   2.  A large amount of bleeding or drainage   3.  Severe pain        **If you have questions or concerns about your procedure,  call Dr. Maxwell at [phone]**

## 2019-07-29 NOTE — ANESTHESIA POSTPROCEDURE EVALUATION
Patient: Debi Burciaga    Procedure(s):  LEFT OOPHORECTOMY, LAPAROSCOPIC    Diagnosis:LEFT FLANK PAIN.  Diagnosis Additional Information: No value filed.    Anesthesia Type:  General, ETT    Note:  Anesthesia Post Evaluation    Patient location during evaluation: PACU  Patient participation: Able to fully participate in evaluation  Level of consciousness: awake and alert  Pain management: adequate  Airway patency: patent  Cardiovascular status: acceptable and hemodynamically stable  Respiratory status: acceptable and unassisted  Hydration status: acceptable  PONV: none             Last vitals:  Vitals:    07/29/19 1710 07/29/19 1720 07/29/19 1730   BP: (!) 144/80 (!) 141/73 131/77   Pulse: 70 75 66   Resp: 15 10 11   Temp: 35.7  C (96.3  F) 35.8  C (96.4  F) 35.7  C (96.3  F)   SpO2: 98% 100% 100%         Electronically Signed By: Jarad Guzman DO  July 29, 2019  5:56 PM

## 2019-07-29 NOTE — PROGRESS NOTES
RECEIVING UNIT ED HANDOFF REVIEW    ED Nurse Handoff Report was reviewed by: Leti Armijo on July 29, 2019 at 12:39 PM

## 2019-07-29 NOTE — ED NOTES
"[facility]  ED Nurse Handoff Report    ED Chief complaint: Flank Pain      ED Diagnosis:   Final diagnoses:   None       Code Status: not addressed     Allergies:   Allergies   Allergen Reactions     Latex Rash       Activity level - Baseline/Home:  Independent  Activity Level - Current:   Independent    Patient's Preferred language: english   Needed?: No    Isolation: No  Infection: Not Applicable  Bariatric?: No    Vital Signs:   Vitals:    07/29/19 1030 07/29/19 1045 07/29/19 1100 07/29/19 1115   BP:       Pulse:       Resp:       Temp:       TempSrc:       SpO2: 97% 96% 99% 99%       Cardiac Rhythm: ,        Pain level: 0-10 Pain Scale: 2    Is this patient confused?: No   Does this patient have a guardian?  No         If yes, is there guardianship documents in the Epic \"Code/ACP\" activity?  No         Guardian Notified?  N/A  [place] - Suicide Severity Rating Scale Completed?  Yes  If yes, what color did the patient score?  White    Patient Report: Initial Complaint: left flank pain   Focused Assessment: left flank pain that comes and goes.  Can go away and then up to 8. No other sx   Tests Performed: labs, ct US   Abnormal Results: ovarian cyst, ? Torsion   Treatments provided: ibuprofen, dilaudid     Family Comments: 1  here     OBS brochure/video discussed/provided to patient/family:yes            Name of person given brochure if not patient: na              Relationship to patient: na    ED Medications:   Medications   ibuprofen (ADVIL/MOTRIN) tablet 600 mg (600 mg Oral Given 7/29/19 0655)   HYDROmorphone (PF) (DILAUDID) injection 0.5 mg (0.5 mg Intravenous Given 7/29/19 1115)       Drips infusing?:  No    For the majority of the shift this patient was Green.   Interventions performed were na.    Severe Sepsis OR Septic Shock Diagnosis Present: No    To be done/followed up on inpatient unit:  unknown     ED NURSE PHONE NUMBER: [phone]       "

## 2019-07-29 NOTE — H&P
Admitted:     2019      PREOPERATIVE HISTORY AND PHYSICAL NOTE       DATE OF PROCEDURE:   2019.       PREOPERATIVE DIAGNOSES:   1.  6 cm left ovarian cyst.   2.  Suspected ovarian torsion.      SCHEDULED PROCEDURE:  Laparoscopic left oophorectomy.      SURGEON:  Adry Maxwell MD.       HISTORY OF PRESENT ILLNESS:  The patient is a 49-year-old  2, para 2-0-0-2 with a gynecological history significant for laparoscopic subtotal hysterectomy in , who presented to the Emergency Department on the morning of 2019 complaining of acute onset of left-sided flank pain.  Evaluation in the ER included stable vital signs.  CT scan revealed no evidence of hydronephrosis or nephrolithiasis although there was noted to be approximately a 6 cm complex of what is suspected to be left ovarian cyst.  Pelvic ultrasound confirmed the presence of a 5.6 x 5 cm complex left ovarian cyst.  Blood flow was seen to that ovary; however, with level of pain, ER physician was concerned for possible torsion.      The patient was evaluated by myself at the bedside.  She noted that the pain was 8 out of 10, somewhat relieved with IV Dilaudid.  On abdominal exam, she had diffuse tenderness and mild rebound on the left side.  The patient has been n.p.o. since the night before.      PAST MEDICAL HISTORY:  Chronic hypertension.      CURRENT MEDICATIONS:  Hydrochlorothiazide.        PAST SURGICAL HISTORY:  Gynecologically, the patient has had NovaSure endometrial ablation and then a laparoscopic subtotal hysterectomy in .      Laboratory evaluation in the emergency room including a CBC and metabolic panel were all within normal limits.      In discussion with the patient and , I discussed that given the degree of the patient's pain and the finding of a left ovarian cyst, recommendation is to proceed with surgical evaluation and probable left oophorectomy due to the possibility of torsion.      We discussed that  this will be done laparoscopically.  We discussed risks of the surgery, including risk of bleeding, infection, damage to other organs including bowel and bladder and ureter.  We did also discuss possibility of laparotomy should we be unable to remove the ovary laparoscopically.      Plan is to proceed to the OR later this afternoon.  All questions were answered.         LIDA DIAZ MD             D: 2019   T: 2019   MT:       Name:     CHLOÉ BUTTS   MRN:      [id number]        Account:      [id number]   :      1970        Admitted:     2019                   Document: [id number]       cc: [facility]

## 2019-07-29 NOTE — PHARMACY-ADMISSION MEDICATION HISTORY
Admission medication history interview status for the 7/29/2019  admission is complete. See EPIC admission navigator for prior to admission medications     Medication history source reliability:Good    Actions taken by pharmacist (provider contacted, etc): Interviewed patient who knew meds - verified with Rx records for doses of medications.      Additional medication history information not noted on PTA med list :None    Medication reconciliation/reorder completed by provider prior to medication history? No    Time spent in this activity: 15 minute    Prior to Admission medications    Medication Sig Last Dose Taking? Auth Provider   hydrochlorothiazide (HYDRODIURIL) 25 MG tablet Take 50 mg by mouth daily  7/28/2019 at am Yes Unknown, Entered By History   ibuprofen (ADVIL/MOTRIN) 200 MG tablet Take 400 mg by mouth every 4 hours as needed for mild pain  at prn Yes Unknown, Entered By History   OnabotulinumtoxinA (BOTOX IJ) As needed for headaches 7/25/2019 Yes Unknown, Entered By History   spironolactone (ALDACTONE) 25 MG tablet Take 25 mg by mouth daily 7/28/2019 at am Yes Unknown, Entered By History   valACYclovir (VALTREX) 1000 mg tablet Take 2,000 mg by mouth 2 times daily as needed   at prn  Reported, Patient     Trinity Conrad,  PharmD  [phone]

## 2019-07-29 NOTE — OR NURSING
Vianca PLASCENCIA handed prescription to Tammy FUENTES.  Necklace and ring given to patient's . Patient transferred to [place] [id number] for discharge later this evening.

## 2019-07-29 NOTE — PROGRESS NOTES
MD Notification    Notified Person: MD    Notified Person Name: Hans    Notification Date/Time: 7/29/19 1340    Notification Interaction: TC    Purpose of Notification: Pt verbalizing nausea. NPO. Does not have anything ordered for pain.    Orders Received: NPO. LR @125. 4mg IV Zofran PRN Q6. 0.2mg IV Dilaudid PRN Q1.    Comments:

## 2019-07-29 NOTE — ED PROVIDER NOTES
History   Chief Complaint:  Flank Pain    HPI   Debi Burciaga is a 49 year old female, with a history of hypertension, who presents with son to the ED for evaluation of left flank pain. The patient reports having left sided abdominal pain that began yesterday and progressed into the night until the pain radiated into her back and up into her scapula. The patient states she took milk of magnesia and went to bed, as she thought it was constipation. However, at 0330 this morning she was awoken by the pain and also developed intense nausea. The patient notes the act of voiding hurts, but no dysuria. She denies any abdominal surgery. The patient denies any fever, vomiting, chest pain, shortness of breath, cough, pain with deep inspiration, leg swelling, calf pain, hematuria, or any other acute symptoms.    Allergies:  No known drug allergies    Medications:    Amlodipine Besylate  Hydrochlorothiazide  Zofran  Tramadol  Valtrex    Past Medical History:    Alopecia  Hypertension  Menorrhagia    Past Surgical History:    Bilateral breast augmentation  D & C  Hysterectomy  Tubal ligation    Family History:    Hypertension    Social History:  Smoking status: Former-3/22/1993  Alcohol use: Yes  Drug use: No  PCP: [facility]  Marital Status:   [2]    Review of Systems   Constitutional: Negative for fever.   Respiratory: Negative for shortness of breath.    Cardiovascular: Negative for chest pain.   Gastrointestinal: Positive for nausea. Negative for vomiting.   Genitourinary: Positive for flank pain. Negative for dysuria and hematuria.   Musculoskeletal: Positive for back pain.   All other systems reviewed and are negative.      Physical Exam     Patient Vitals for the past 24 hrs:   BP Temp Temp src Pulse Heart Rate Resp SpO2   07/29/19 1255 139/79 97.3  F (36.3  C) Oral -- 62 16 97 %   07/29/19 1230 115/72 -- -- 66 -- -- 96 %   07/29/19 1215 (!) 124/97 -- -- 68 -- -- 97 %   07/29/19 1200 134/84 --  -- 69 -- -- 99 %   07/29/19 1145 135/78 -- -- 64 -- -- 95 %   07/29/19 1130 139/89 -- -- 68 -- -- 98 %   07/29/19 1115 -- -- -- -- -- -- 99 %   07/29/19 1100 -- -- -- -- -- -- 99 %   07/29/19 1045 -- -- -- -- -- -- 96 %   07/29/19 1030 -- -- -- -- -- -- 97 %   07/29/19 1005 -- -- -- 69 -- -- 98 %   07/29/19 1000 127/83 -- -- -- -- -- --   07/29/19 0615 (!) 149/108 98.6  F (37  C) Oral -- 74 16 97 %     Physical Exam  General: Well-nourished, grimacing and appears to be in severe pain   eyes: PERRL, conjunctivae pink no scleral icterus or conjunctival injection  ENT:  Moist mucus membranes, posterior oropharynx clear without erythema or exudates  Respiratory:  Lungs clear to auscultation bilaterally, no crackles/rubs/wheezes.  Good air movement  CV: Normal rate and rhythm, no murmurs/rubs/gallops  GI:  Abdomen soft and non-distended.  Normoactive BS.  No tenderness, guarding or rebound  Skin: Warm, dry.  No rashes or petechiae  Musculoskeletal: No peripheral edema or calf tenderness  Neuro: Alert and oriented to person/place/time  Psychiatric: Normal affect      Emergency Department Course   Imaging:  Radiographic findings were communicated with the patient who voiced understanding of the findings.    CT Abd/pelvis without contrast:  1. Left hemipelvis 6.0 cm cyst, presumably a left ovarian cyst with a  small amount of pelvic peritoneal fluid in the cul-de-sac.  2. Sigmoid diverticulosis.  3. No evidence of urinary tract stone or hydronephrosis.    Imaging independently reviewed and agree with radiologist interpretation.     US Pelvic complete with transvaginal & Abd/Pel Duplex Limited:  Left ovarian 5.6 cm cyst. Given the appearance of initial  and subsequent left ovarian blood flow, intermittent torsion cannot be  excluded.    Imaging independently reviewed and agree with radiologist interpretation.    Laboratory:  CBC: HGB 15.9 (H), o/w WNL (WBC 6.3, )    CMP: WNL (Creatinine: 0.60)    UA: Protein Albumin  10, Leukocyte Esterase Large, Squamous Epithelial/ HPF 10 (H), Mucous Present, Calcium Oxalate Few, Amorphous Crystals Few, o/w Negative    Interventions:  0655: Ibuprofen 600 mg PO  1115: Dilaudid 0.5 mg IV    Emergency Department Course:  Past medical records, nursing notes, and vitals reviewed.  0615: I performed an exam of the patient and obtained history, as documented above.  IV inserted and blood drawn.  The patient was sent for an abdomen CT and US while in the emergency department, findings above.    0755: I rechecked the patient. Explained findings to patient. Patient agrees to further ultrasound imaging.    1051: I spoke to Dr. Maxwell on-call for OBGYN.    1055: I rechecked the patient. Explained findings to patient. The patient is in more pain and would like to be admitted.    1118: I spoke to Dr. Maxwell on-call for OBGYN.    Findings and plan explained to the Patient who consents to admission.     1120: Discussed the patient with Dr. Maxwell, who will admit the patient to an observation bed for further monitoring, evaluation, and treatment.     Impression & Plan    Medical Decision Making:  Debi Burciaga is a 49 year old female who presents with left-sided abdominal and flank pain that is colicky in nature.  She actually has a fairly benign abdominal exam and labs were reassuring.  A CT scan was obtained to rule out ureterolithiasis and showed no evidence of kidney stone but did show a large cyst.  Her symptoms are very concerning for intermittent ovarian torsion.  Giving her cyst is greater than 5 cm she is at increased risk for torsion.  An ultrasound was obtained and did show blood flow though it was noted that she had decreased blood flow during times of significant pain.  Unfortunately pelvic ultrasound also has a high rate of false negatives and these findings along with her colicky and severe pain and the underlying ovarian cyst on the same side make me very concerned that she is indeed  having an I spoke with Dr. Lopez and she was gracious enough to come to the emergency department to evaluate Ms. Burciaga and recommended admission with likely surgical exploration later today.  Pain was controlled and she was placed in the observation unit.    Diagnosis:    ICD-10-CM    1. Cyst of left ovary N83.202    2. Ovarian torsion N83.519        Disposition:  Admitted to an observation bed.    Enrico Lawton  7/29/2019    EMERGENCY DEPARTMENT  Scribe Disclosure:  Enrico LANDERS, am serving as a scribe at 6:15 AM on 7/29/2019 to document services personally performed by Alba Marcial MD based on my observations and the provider's statements to me.         Alba Marcial MD  07/29/19 [id number]

## 2019-07-29 NOTE — ANESTHESIA CARE TRANSFER NOTE
Patient: Debi Burciaga    Procedure(s):  LEFT OOPHORECTOMY, LAPAROSCOPIC    Diagnosis: LEFT FLANK PAIN.  Diagnosis Additional Information: No value filed.    Anesthesia Type:   General, ETT     Note:  Airway :Face Mask  Patient transferred to:PACU  Comments: Neuromuscular blockade reversed after TOF 2/4, spontaneous respirations, adequate tidal volumes, followed commands to voice, oropharynx suctioned with soft flexible catheter, extubated atraumatically, extubated with suction, airway patent after extubation.  Oxygen via facemask at 10 liters per minute to PACU. Oxygen tubing connected to wall O2 in PACU, SpO2, NiBP, and EKG monitors and alarms on and functioning, Jeanmarie Hugger warmer connected to patient gown, report on patient's clinical status given to PACU RN, RN questions answered. Handoff Report: Identifed the Patient, Identified the Reponsible Provider, Reviewed the pertinent medical history, Discussed the surgical course, Reviewed Intra-OP anesthesia mangement and issues during anesthesia, Set expectations for post-procedure period and Allowed opportunity for questions and acknowledgement of understanding      Vitals: (Last set prior to Anesthesia Care Transfer)    CRNA VITALS  7/29/2019 1636 - 7/29/2019 1711      7/29/2019             Pulse:  87    SpO2:  100 %    Resp Rate (set):  10                Electronically Signed By: TASNEEM Givens CRNA  July 29, 2019  5:11 PM

## 2019-07-30 VITALS
HEART RATE: 69 BPM | SYSTOLIC BLOOD PRESSURE: 129 MMHG | DIASTOLIC BLOOD PRESSURE: 76 MMHG | TEMPERATURE: 97.7 F | OXYGEN SATURATION: 99 % | RESPIRATION RATE: 16 BRPM

## 2019-07-30 PROCEDURE — 40000934 ZZH STATISTIC OUTPATIENT (NON-OBS) DAY

## 2019-07-30 PROCEDURE — 25000132 ZZH RX MED GY IP 250 OP 250 PS 637: Performed by: OBSTETRICS & GYNECOLOGY

## 2019-07-30 RX ORDER — OXYCODONE HYDROCHLORIDE 5 MG/1
5 TABLET ORAL
Qty: 10 TABLET | Refills: 0 | Status: SHIPPED | OUTPATIENT
Start: 2019-07-30 | End: 2021-10-12

## 2019-07-30 RX ADMIN — IBUPROFEN 600 MG: 600 TABLET ORAL at 08:05

## 2019-07-30 RX ADMIN — ACETAMINOPHEN 975 MG: 325 TABLET, FILM COATED ORAL at 08:05

## 2019-07-30 NOTE — PROGRESS NOTES
Gyn Post-operative Note POD# 1    S:  Patient doing well.  Pain well controlled on ibuprofen and tylenol. Declines oxycodone.  Ambulating.  Tolerating regular diet this morning diet.  Voiding.       O:   /76 (BP Location: Right arm)   Pulse 69   Temp 97.7  F (36.5  C) (Oral)   Resp 16   SpO2 99%      Gen- A&O, NAD  Abd- soft, non-tender, non-distended, no guarding or rebound  Incision(s)- C/D/I  Ext- Non-tender, no edema    Labs:    Hemoglobin   Date Value Ref Range Status   07/29/2019 15.9 (H) 11.7 - 15.7 g/dL Final   ]     Pathology pending    A/P:  49 year old POD# 1 s/p laparoscopic LSO    1.  Routine post-op cares  2. Discharge this morning  3. Ibuprofen and tylenol OTC. Pt declines oxy rx  4. Restrictions and wound care reviewed  5. RTC in 1-2 weeks to see Dr. Maxwell to review surgery, pathology, plan if LLQ pain continues.     Nivia Phillip  7/30/2019  9:03 AM

## 2019-07-30 NOTE — OP NOTE
Procedure Date: 07/29/2019      PREOPERATIVE DIAGNOSES:   1.  Acute abdominal pain.   2.  A 6 cm ovarian cyst.   3.  Possible ovarian torsion.      POSTOPERATIVE DIAGNOSES:   1.  Acute abdominal pain.     2.  A 6 cm ovarian cyst.     3.  Possible ovarian torsion.      PROCEDURE:  Laparoscopic left oophorectomy and salpingectomy.      ANESTHESIA:  General.      SURGEON: Adry Maxwell MD      ASSISTANT: Dr.Rachel Landaverde     SPECIMENS:  Left ovary and fallopian tube.      ESTIMATED BLOOD LOSS:  Less than 10 mL      FINDINGS:  At time of laparoscopic evaluation of the pelvis, uterus was noted to be surgically absent.  Right ovary was identified and noted to be normal.  Right appendix was seen and normal.  On the left ovary, there was an approximately 6 x 5 cm simple appearing ovarian cyst.  The ovary did not appear to be acutely torsed.      COMPLICATIONS:  None.        PROCEDURE IN DETAIL:  The patient was taken to the operating room where general anesthesia was given and found to be adequate.  She was placed into supine position with a Bustos catheter draining clear urine.  The patient was prepped and draped in normal sterile fashion.  A timeout was performed.  A 5 mm incision was made with a scalpel in the infraumbilical fold.  Veress needle was used to enter the abdominal cavity with placement verified using saline drip method.  Pneumoperitoneum was then obtained using 5 liters of CO2 gas.  Veress needle was removed.  A 5 mm trocar was placed with direct visualization with the laparoscope and intra-abdominal placement confirmed.  At this time, the patient was placed into Trendelenburg.  Findings included those as noted above.  At this time, 5 mm incision was made in the right lower quadrant and a 5 mm trocar placed under direct visualization.  In the left lower quadrant 10 mm incision was made with a scalpel and 11 mm trocar placed under direct visualization.  At this time, the patient was placed into steeper  Trendelenburg and probe was used to remove the bowel from the pelvis.  The left ovary was grasped with a grasper and the Thunderbeat was then used to cauterize and transect the infundibulopelvic vessels and ligament completely detaching the left ovary and fallopian tube.      At this time, an EndoCatch bag was placed through the 10 mm port and the left ovary easily placed into the EndoCatch bag.  EndoCatch bag was brought to the surface of the abdominal wall.  The ovarian cyst was then drained within the EndoCatch bag and the bag and cyst were easily removed through the 10 mm port site.      At this time, the 10 mm trocar was replaced.  Pelvis was copiously irrigated.  The IP ligament and vessels where the ovary was excised were hemostatic.      At this time, a Yahir-Aspen was placed in the left lower quadrant port and 0 Vicryl used to close the fascial portion of that port site.  At this time, pneumoperitoneum was deflated and the 5 mm trocars were removed.      At this time, the infraumbilical incision was closed using a 4-0 Monocryl subcuticular stitch.  The right lower quadrant port site was closed with Dermabond.  The left lower quadrant one 2-0 Vicryl reapproximating subcuticular stitch was placed and the skin closed with Dermabond.      At completion of the procedure, the OR staff was instructed to remove the Bustos.  Sponge and needle counts correct x 2.      SPECIMENS:  Included the left ovary and total EBL was less than 10 mL.         LIDA DIAZ MD             D: 2019   T: 2019   MT: GEORGE      Name:     CHLOÉ BUTTS   MRN:      [id number]        Account:        [id number]   :      1970           Procedure Date: 2019      Document: [id number]

## 2019-07-30 NOTE — PLAN OF CARE
Pt A&O x4. VSS on RA. Pt says pain is improving, only wanted one dose of scheduled Ibuprofen. Pt up to bathroom to void, voiding adequately. 3 lap sites covered with bandaids, CDI. Tolerating diet, pt denies nausea. Up with SBA, denies dizziness or lightheadedness. Plan for discharge today.

## 2019-07-30 NOTE — PLAN OF CARE
Pt a/o up in room and halls. Ate am meal no nausea iv dced. To bathroom voiding large amts. Pain controled with tylenol and ibprofen. Pt dced with family meds belongings to home.

## 2019-07-30 NOTE — PROGRESS NOTES
"PRIMARY DIAGNOSIS: \"GENERIC\" NURSING  OUTPATIENT/OBSERVATION GOALS TO BE MET BEFORE DISCHARGE:  1. ADLs back to baseline: Yes    2. Activity and level of assistance: Up with standby assistance.    3. Pain status: Improved-controlled with oral pain medications.    4. Return to near baseline physical activity: Yes     Discharge Planner Nurse   Safe discharge environment identified: Yes  Barriers to discharge: No       Entered by: Caren Corbett 07/30/2019 4:51 AM     Please review provider order for any additional goals.   Nurse to notify provider when observation goals have been met and patient is ready for discharge.  "

## 2019-07-31 LAB — COPATH REPORT: NORMAL

## 2019-08-18 NOTE — ADDENDUM NOTE
Addendum  created 08/18/19 1700 by Mayelin Soliz MD    Attestation recorded in Intraprocedure, Intraprocedure Attestations filed

## 2019-11-02 ENCOUNTER — HEALTH MAINTENANCE LETTER (OUTPATIENT)
Age: 49
End: 2019-11-02

## 2020-11-14 ENCOUNTER — HEALTH MAINTENANCE LETTER (OUTPATIENT)
Age: 50
End: 2020-11-14

## 2021-09-12 ENCOUNTER — HEALTH MAINTENANCE LETTER (OUTPATIENT)
Age: 51
End: 2021-09-12

## 2021-10-12 ENCOUNTER — OFFICE VISIT (OUTPATIENT)
Dept: VASCULAR SURGERY | Facility: CLINIC | Age: 51
End: 2021-10-12
Payer: COMMERCIAL

## 2021-10-12 DIAGNOSIS — I78.1 ASYMPTOMATIC SPIDER VEINS OF BOTH LOWER EXTREMITIES: Primary | ICD-10-CM

## 2021-10-12 PROCEDURE — 99202 OFFICE O/P NEW SF 15 MIN: CPT | Performed by: SURGERY

## 2021-10-12 NOTE — PROGRESS NOTES
VEIN SOLUTIONS CONSULT    Impression:   1.  Bilateral lower extremity spider veins    Plan:   I had a nice discussion with Debi reviewing the pathophysiology of spider veins.  She is a candidate for cosmetic injection sclerotherapy.  I thoroughly discussed the specifics of this procedure including potential complications and the anticipated postprocedural course.  She understands that this is cosmetic and would not be covered by her insurance.  She is given a prescription for thigh-high compression stockings.  She likely will schedule her first round of cosmetic injection sclerotherapy in the near future.  All of her questions were answered and she verbalizes full understanding to the above.      HPI:   Debi Burciaga is a healthy 51-year-old female who presents for evaluation of lower extremity spider veins.  She complains of frequent bruising of her right leg.  Apart from this she has no other venous symptoms.  She has no prior history of sclerotherapy or venous intervention.  Her past medical history is unremarkable.      CURRENT MEDICATIONS  VITAMIN D PO,   hydrochlorothiazide (HYDRODIURIL) 25 MG tablet, Take 50 mg by mouth daily   ibuprofen (ADVIL/MOTRIN) 200 MG tablet, Take 3 tablets (600 mg) by mouth every 6 hours as needed for other (mild and/or inflammatory pain)  OnabotulinumtoxinA (BOTOX IJ), As needed for headaches  spironolactone (ALDACTONE) 25 MG tablet, Take 25 mg by mouth daily  valACYclovir (VALTREX) 1000 mg tablet, Take 2,000 mg by mouth 2 times daily as needed     No current facility-administered medications on file prior to visit.        PAST MEDICAL HISTORY  Past Medical History:   Diagnosis Date     Abnormal Pap smear of cervix      Alopecia      Hypertension      Menorrhagia          PAST SURGICAL HISTORY:  Past Surgical History:   Procedure Laterality Date     BREAST SURGERY      breast augmentation, bilateral     DILATION AND CURETTAGE, HYSTEROSCOPY, ABLATE ENDOMETRIUM BALA,  COMBINED  2012    Procedure:COMBINED DILATION AND CURETTAGE, HYSTEROSCOPY, ABLATE ENDOMETRIUM NOVASURE; Surgeon:ADRY DIAZ; Location:[facility]     GYN SURGERY      colposcopy;  cryo;  iud insertion;  vacuum evacuation     LAPAROSCOPIC HYSTERECTOMY SUPRACERVICAL  2013    Procedure: LAPAROSCOPIC HYSTERECTOMY SUPRACERVICAL;  LAPAROSCOPIC HYSTERECTOMY SUPRACERVICAL  right salpingectomy, cystoscopy;  Surgeon: Adry Diaz MD;  Location:  OR     LAPAROSCOPIC OOPHORECTOMY Left 2019    Procedure: LEFT OOPHORECTOMY, LAPAROSCOPIC;  Surgeon: Adry Diaz MD;  Location:  OR     LAPAROSCOPIC TUBAL LIGATION  2012    Procedure:LAPAROSCOPIC TUBAL LIGATION; LAPAROSCOPIC TUBAL LIGATION, HYSTEROSCOPY, DILITATIONA AND CURETTAGE  WITH NOVASURE ENDOMETRIAL ABLATION; Surgeon:ADRY DIAZ; Location:[facility]       ALLERGIES     Allergies   Allergen Reactions     Amlodipine Swelling     Latex Rash       FAMILY HISTORY  No family history on file.    SOCIAL HISTORY  Social History     Tobacco Use     Smoking status: Former Smoker     Types: Cigarettes     Quit date: 3/22/1993     Years since quittin.5     Smokeless tobacco: Never Used   Substance Use Topics     Alcohol use: Yes     Comment: 2 -3 per week     Drug use: No       ROS:   Review of Systems   All other systems reviewed and are negative.        EXAM:  There were no vitals taken for this visit.  Physical Exam  Vitals reviewed.   Constitutional:       Appearance: Normal appearance.   HENT:      Head: Normocephalic and atraumatic.   Eyes:      General: No scleral icterus.     Extraocular Movements: Extraocular movements intact.      Pupils: Pupils are equal, round, and reactive to light.   Cardiovascular:      Comments: Mild, scattered spider veins throughout both lower extremities.  Musculoskeletal:         General: Normal range of motion.      Cervical back: Normal range of motion.    Skin:     General: Skin is warm and dry.   Neurological:      General: No focal deficit present.      Mental Status: Mental status is at baseline.   Psychiatric:         Mood and Affect: Mood normal.         Behavior: Behavior normal.         Thought Content: Thought content normal.         Judgment: Judgment normal.       Labs:  LIPID RESULTS:  No results found for: CHOL, HDL, LDL, TRIG, CHOLHDLRATIO    CBC RESULTS:  Lab Results   Component Value Date    WBC 6.3 07/29/2019    RBC 5.19 07/29/2019    HGB 15.9 (H) 07/29/2019    HCT 44.1 07/29/2019    MCV 85 07/29/2019    MCH 30.6 07/29/2019    MCHC 36.1 07/29/2019    RDW 12.2 07/29/2019     07/29/2019       BMP RESULTS:  Lab Results   Component Value Date     07/29/2019    POTASSIUM 3.6 07/29/2019    CHLORIDE 105 07/29/2019    CO2 30 07/29/2019    ANIONGAP 4 07/29/2019    GLC 87 07/29/2019    BUN 16 07/29/2019    CR 0.60 07/29/2019    GFRESTIMATED >90 07/29/2019    GFRESTBLACK >90 07/29/2019    MAUREEN 9.1 07/29/2019        A1C RESULTS:  No results found for: A1C      Imaging:  No results found for this or any previous visit (from the past 24 hour(s)).        Total length of this encounter was 15 minutes.      Henry Jack MD

## 2021-10-12 NOTE — LETTER
10/12/2021         RE: Debi Burciaga  [address] [facility] [address]        Dear Colleague,    Thank you for referring your patient, Debi Burciaga, to the [facility] [place]. Please see a copy of my visit note below.    [facility]UT[facility] CONSULT    Impression:   1.  Bilateral lower extremity spider veins    Plan:   I had a nice discussion with Debi reviewing the pathophysiology of spider veins.  She is a candidate for cosmetic injection sclerotherapy.  I thoroughly discussed the specifics of this procedure including potential complications and the anticipated postprocedural course.  She understands that this is cosmetic and would not be covered by her insurance.  She is given a prescription for thigh-high compression stockings.  She likely will schedule her first round of cosmetic injection sclerotherapy in the near future.  All of her questions were answered and she verbalizes full understanding to the above.      HPI:   Debi Burciaga is a healthy 51-year-old female who presents for evaluation of lower extremity spider veins.  She complains of frequent bruising of her right leg.  Apart from this she has no other venous symptoms.  She has no prior history of sclerotherapy or venous intervention.  Her past medical history is unremarkable.      CURRENT MEDICATIONS  VITAMIN D PO,   hydrochlorothiazide (HYDRODIURIL) 25 MG tablet, Take 50 mg by mouth daily   ibuprofen (ADVIL/MOTRIN) 200 MG tablet, Take 3 tablets (600 mg) by mouth every 6 hours as needed for other (mild and/or inflammatory pain)  OnabotulinumtoxinA (BOTOX IJ), As needed for headaches  spironolactone (ALDACTONE) 25 MG tablet, Take 25 mg by mouth daily  valACYclovir (VALTREX) 1000 mg tablet, Take 2,000 mg by mouth 2 times daily as needed     No current facility-administered medications on file prior to visit.        PAST MEDICAL HISTORY  Past Medical History:   Diagnosis Date     Abnormal Pap smear of  cervix      Alopecia      Hypertension      Menorrhagia          PAST SURGICAL HISTORY:  Past Surgical History:   Procedure Laterality Date     BREAST SURGERY      breast augmentation, bilateral     DILATION AND CURETTAGE, HYSTEROSCOPY, ABLATE ENDOMETRIUM NOVASURE, COMBINED  2012    Procedure:COMBINED DILATION AND CURETTAGE, HYSTEROSCOPY, ABLATE ENDOMETRIUM NOVASURE; Surgeon:ADRY DIAZ; Location:[facility]     GYN SURGERY      colposcopy;  cryo;  iud insertion;  vacuum evacuation     LAPAROSCOPIC HYSTERECTOMY SUPRACERVICAL  2013    Procedure: LAPAROSCOPIC HYSTERECTOMY SUPRACERVICAL;  LAPAROSCOPIC HYSTERECTOMY SUPRACERVICAL  right salpingectomy, cystoscopy;  Surgeon: Adry Diaz MD;  Location:  OR     LAPAROSCOPIC OOPHORECTOMY Left 2019    Procedure: LEFT OOPHORECTOMY, LAPAROSCOPIC;  Surgeon: Adry Diaz MD;  Location:  OR     LAPAROSCOPIC TUBAL LIGATION  2012    Procedure:LAPAROSCOPIC TUBAL LIGATION; LAPAROSCOPIC TUBAL LIGATION, HYSTEROSCOPY, DILITATIONA AND CURETTAGE  WITH NOVASURE ENDOMETRIAL ABLATION; Surgeon:ADRY DIAZ; Location:[facility]       ALLERGIES     Allergies   Allergen Reactions     Amlodipine Swelling     Latex Rash       FAMILY HISTORY  No family history on file.    SOCIAL HISTORY  Social History     Tobacco Use     Smoking status: Former Smoker     Types: Cigarettes     Quit date: 3/22/1993     Years since quittin.5     Smokeless tobacco: Never Used   Substance Use Topics     Alcohol use: Yes     Comment: 2 -3 per week     Drug use: No       ROS:   Review of Systems   All other systems reviewed and are negative.        EXAM:  There were no vitals taken for this visit.  Physical Exam  Vitals reviewed.   Constitutional:       Appearance: Normal appearance.   HENT:      Head: Normocephalic and atraumatic.   Eyes:      General: No scleral icterus.     Extraocular Movements: Extraocular movements  intact.      Pupils: Pupils are equal, round, and reactive to light.   Cardiovascular:      Comments: Mild, scattered spider veins throughout both lower extremities.  Musculoskeletal:         General: Normal range of motion.      Cervical back: Normal range of motion.   Skin:     General: Skin is warm and dry.   Neurological:      General: No focal deficit present.      Mental Status: Mental status is at baseline.   Psychiatric:         Mood and Affect: Mood normal.         Behavior: Behavior normal.         Thought Content: Thought content normal.         Judgment: Judgment normal.       Labs:  LIPID RESULTS:  No results found for: CHOL, HDL, LDL, TRIG, CHOLHDLRATIO    CBC RESULTS:  Lab Results   Component Value Date    WBC 6.3 07/29/2019    RBC 5.19 07/29/2019    HGB 15.9 (H) 07/29/2019    HCT 44.1 07/29/2019    MCV 85 07/29/2019    MCH 30.6 07/29/2019    MCHC 36.1 07/29/2019    RDW 12.2 07/29/2019     07/29/2019       BMP RESULTS:  Lab Results   Component Value Date     07/29/2019    POTASSIUM 3.6 07/29/2019    CHLORIDE 105 07/29/2019    CO2 30 07/29/2019    ANIONGAP 4 07/29/2019    GLC 87 07/29/2019    BUN 16 07/29/2019    CR 0.60 07/29/2019    GFRESTIMATED >90 07/29/2019    GFRESTBLACK >90 07/29/2019    MAUREEN 9.1 07/29/2019        A1C RESULTS:  No results found for: A1C      Imaging:  No results found for this or any previous visit (from the past 24 hour(s)).        Total length of this encounter was 15 minutes.      Henry Jack MD          Again, thank you for allowing me to participate in the care of your patient.        Sincerely,        Henry Jack MD

## 2021-10-12 NOTE — PROGRESS NOTES
After Visit Follow Up  Per Dr. Jack, patient was recommended to have 2 - 3 sessions at $407 of cosmetic sclerotherapy.    Reviewed with and gave to patient our vein clinic sclerotherapy packet of information which includes basic sclerotherapy information, pre-treatment instructions and post-treatment instructions.    Patient in agreement with plan and had no further questions.    Kate Pham RN on 10/12/2021 at 4:40 PM

## 2021-11-16 ENCOUNTER — OFFICE VISIT (OUTPATIENT)
Dept: VASCULAR SURGERY | Facility: CLINIC | Age: 51
End: 2021-11-16

## 2021-11-16 DIAGNOSIS — I78.1 ASYMPTOMATIC SPIDER VEINS OF BOTH LOWER EXTREMITIES: Primary | ICD-10-CM

## 2021-11-16 PROCEDURE — A6533 GC STOCKING THIGHLNGTH 18-30: HCPCS | Performed by: SURGERY

## 2021-11-16 PROCEDURE — S9999 SALES TAX: HCPCS | Performed by: SURGERY

## 2021-11-16 PROCEDURE — 36468 NJX SCLRSNT SPIDER VEINS: CPT | Performed by: SURGERY

## 2021-11-16 PROCEDURE — 99207 PR NO CHARGE NURSE ONLY: CPT

## 2021-11-16 NOTE — LETTER
11/16/2021         RE: Debi Burciaga  [address] [facility] [address]        Dear Colleague,    Thank you for referring your patient, Debi Burciaga, to the [facility] [place]. Please see a copy of my visit note below.          [facility] Procedure Note    Debi Burciaga  November 16, 2021    Debi Burciaga is a 51 year old year old female. She presents for No chief complaint on file.  .    There were no vitals taken for this visit.    Flowsheet Data 10/12/2021   Side: Bilateral       Sclerotherapy    Date/Time: 11/16/2021 9:40 AM  Performed by: Henry Jack MD  Authorized by: Henry Jack MD     Time out: Immediately prior to the procedure a time out was called    Preparation: Patient was prepped and draped in usual sterile fashion    Type:  Cosmetic  Session:  Full  Procedure side:  Bilateral  Solution/Amount:  .5 POLIDOCANOL and 1% POLIDOCANOL  Syringes::  3 syringes of 0.5% polidocanol; 2 syringes of 1% polidocanol.  Patient tolerance:  Patient tolerated the procedure well with no immediate complications  Wrap/Hose:  Hose    DESCRIPTION OF TECHNIQUE:  After informed consent was obtained the patient was placed on the table in a supine position. Her bilateral lower extremities were prepped in the usual manner using an alcohol solution. I utilized a Syris polarized light system. She had a prominent reticular vein coursing up the lateral aspect of her right thigh and calf. I accessed this vein using a 27-gauge butterfly needle and I injected it with foamed 1% polidocanol. There was excellent uptake of the solution without obvious extravasation. I utilized a similar technique to address another reticular vein on the lateral aspect of the left leg. She had scattered spider telangiectasias throughout the remainder of both legs below the knees. I accessed these using foamed 0.5% polidocanol and 30-gauge needles. In all cases there was excellent  uptake with no obvious extravasation.    She tolerated the procedure without incident. I reviewed post procedure instructions. Her legs were cleansed and dried and she was helped into her bilateral thigh-high compression stockings. Follow-up will be with me for a sclerotherapy recheck and possible second treatment in 6 weeks. All of her questions were answered and she verbalized full understanding to the above.    Henry Jack MD      Again, thank you for allowing me to participate in the care of your patient.        Sincerely,        Henry Jack MD

## 2021-11-16 NOTE — PROGRESS NOTES
[facility] Procedure Note    Debi Burciaga  November 16, 2021    Debi Burciaga is a 51 year old year old female. She presents for No chief complaint on file.  .    There were no vitals taken for this visit.    Flowsheet Data 10/12/2021   Side: Bilateral       Sclerotherapy    Date/Time: 11/16/2021 9:40 AM  Performed by: Henry Jack MD  Authorized by: Henry Jack MD     Time out: Immediately prior to the procedure a time out was called    Preparation: Patient was prepped and draped in usual sterile fashion    Type:  Cosmetic  Session:  Full  Procedure side:  Bilateral  Solution/Amount:  .5 POLIDOCANOL and 1% POLIDOCANOL  Syringes::  3 syringes of 0.5% polidocanol; 2 syringes of 1% polidocanol.  Patient tolerance:  Patient tolerated the procedure well with no immediate complications  Wrap/Hose:  Hose    DESCRIPTION OF TECHNIQUE:  After informed consent was obtained the patient was placed on the table in a supine position. Her bilateral lower extremities were prepped in the usual manner using an alcohol solution. I utilized a Syris polarized light system. She had a prominent reticular vein coursing up the lateral aspect of her right thigh and calf. I accessed this vein using a 27-gauge butterfly needle and I injected it with foamed 1% polidocanol. There was excellent uptake of the solution without obvious extravasation. I utilized a similar technique to address another reticular vein on the lateral aspect of the left leg. She had scattered spider telangiectasias throughout the remainder of both legs below the knees. I accessed these using foamed 0.5% polidocanol and 30-gauge needles. In all cases there was excellent uptake with no obvious extravasation.    She tolerated the procedure without incident. I reviewed post procedure instructions. Her legs were cleansed and dried and she was helped into her bilateral thigh-high compression stockings. Follow-up will be with me for a  sclerotherapy recheck and possible second treatment in 6 weeks. All of her questions were answered and she verbalized full understanding to the above.    Henry Jack MD

## 2021-11-23 NOTE — PROGRESS NOTES
Patient purchased 1 pair(s) of black, thigh high, closed-toe, size 4 compression hose from the clinic today.     Informed patient all compression hose purchases are final.    JAYDE Robbins on 11/23/2021 at 8:50 AM

## 2021-12-28 ENCOUNTER — OFFICE VISIT (OUTPATIENT)
Dept: VASCULAR SURGERY | Facility: CLINIC | Age: 51
End: 2021-12-28

## 2021-12-28 DIAGNOSIS — I78.1 ASYMPTOMATIC SPIDER VEINS OF BOTH LOWER EXTREMITIES: Primary | ICD-10-CM

## 2021-12-28 PROCEDURE — S9999 SALES TAX: HCPCS | Performed by: SURGERY

## 2021-12-28 PROCEDURE — 36468 NJX SCLRSNT SPIDER VEINS: CPT | Performed by: SURGERY

## 2021-12-28 RX ORDER — CETIRIZINE HYDROCHLORIDE 10 MG/1
1 TABLET ORAL EVERY 24 HOURS
COMMUNITY
Start: 2021-03-23

## 2021-12-28 NOTE — LETTER
12/28/2021         RE: Debi Burciaga  [address] [facility] [address]        Dear Colleague,    Thank you for referring your patient, Debi Burciaga, to the [facility] [place]. Please see a copy of my visit note below.          [facility] Procedure Note    Debi Burciaga  December 28, 2021    Debi Burciaga is a 51 year old year old female. She presents for Sclerotherapy  .    There were no vitals taken for this visit.    Flowsheet Data 10/12/2021   Side: Bilateral       Sclerotherapy    Date/Time: 12/28/2021 9:06 AM  Performed by: Henry Jack MD  Authorized by: Henry Jack MD     Time out: Immediately prior to the procedure a time out was called    Preparation: Patient was prepped and draped in usual sterile fashion    Type:  Cosmetic  Session:  Limited  Procedure side:  Bilateral  Solution/Amount:  .5 POLIDOCANOL  Patient tolerance:  Patient tolerated the procedure well with no immediate complications  Wrap/Hose:  Hose    OPERATIVE DESCRIPTION:  Debi Burciaga is status post her first round of cosmetic injection sclerotherapy performed on 11/16/2021.  She had excellent results with that procedure.  Today she would like to have her second round of cosmetic injection sclerotherapy.    After informed consent was obtained she was placed on the table and her bilateral lower extremities were prepped with an alcohol solution.  She had a cluster of spider telangiectasias on the lateral aspect of her proximal and mid left thigh and an additional cluster of spider telangiectasias near site of prior injection on the anterolateral aspect of the mid right calf.    I utilized a Syris polarized light system.  I used 2 syringes of foamed 0.5% polidocanol.  I accessed these clusters of spider telangiectasias using 30-gauge needles.  I had excellent uptake of the solution with no obvious extravasation.    She tolerated the procedure without  incident.  Her legs were cleansed and dried and she was helped into her thigh-high support stockings.  We reviewed post procedural instructions.  Follow-up will be with me in about 6 weeks for a sclerotherapy recheck.      Henry Jack MD      Again, thank you for allowing me to participate in the care of your patient.        Sincerely,        Henry Jack MD

## 2021-12-28 NOTE — PROGRESS NOTES
[facility] Procedure Note    Debi Burciaga  December 28, 2021    Debi Burciaga is a 51 year old year old female. She presents for Sclerotherapy  .    There were no vitals taken for this visit.    Flowsheet Data 10/12/2021   Side: Bilateral       Sclerotherapy    Date/Time: 12/28/2021 9:06 AM  Performed by: Henry Jack MD  Authorized by: Henry Jack MD     Time out: Immediately prior to the procedure a time out was called    Preparation: Patient was prepped and draped in usual sterile fashion    Type:  Cosmetic  Session:  Limited  Procedure side:  Bilateral  Solution/Amount:  .5 POLIDOCANOL  Patient tolerance:  Patient tolerated the procedure well with no immediate complications  Wrap/Hose:  Hose    OPERATIVE DESCRIPTION:  Debi Burciaga is status post her first round of cosmetic injection sclerotherapy performed on 11/16/2021.  She had excellent results with that procedure.  Today she would like to have her second round of cosmetic injection sclerotherapy.    After informed consent was obtained she was placed on the table and her bilateral lower extremities were prepped with an alcohol solution.  She had a cluster of spider telangiectasias on the lateral aspect of her proximal and mid left thigh and an additional cluster of spider telangiectasias near site of prior injection on the anterolateral aspect of the mid right calf.    I utilized a [place] polarized light system.  I used 2 syringes of foamed 0.5% polidocanol.  I accessed these clusters of spider telangiectasias using 30-gauge needles.  I had excellent uptake of the solution with no obvious extravasation.    She tolerated the procedure without incident.  Her legs were cleansed and dried and she was helped into her thigh-high support stockings.  We reviewed post procedural instructions.  Follow-up will be with me in about 6 weeks for a sclerotherapy recheck.      Henry Jack MD

## 2021-12-28 NOTE — PATIENT INSTRUCTIONS
SCLEROTHERAPY AFTER CARE  Immediately:  After treatment, walk for 10-15 minutes before getting in your car.  If your trip home is more than 1 hour, stop and walk around for 5-10 minutes. Avoid sitting or standing for extended periods.   First 24 hours: Wear your compression continuously, even while in bed. After the 24 hours, you may shower if you want to. Put your hose back on, unless you are going to bed. You should NOT wear compression to bed after the first 24 hours. You may fly the next day, but wear your compression.   For 5 days: Wear the compression hose for waking hours only. You may continue to wear them longer than 5 days if you prefer.   For days 5-7: Walking is encouraged, as it promotes efficient circulation in your veins. You may do activities that raise your heart rate, but do NOT run, jog, do high impact aerobics, or weight lifting. After 7 days, no activity restrictions.    Shaving: Wait a few days to shave or apply lotion.   Bathing: Do NOT take hot baths or sit in a hot tub for 7-10 days.    For 1 year: Wear SPF 30 sunscreen on your legs when in the sun. This is very important! It helps prevent darkening of the skin at the injection sites.   Medications: You may resume your usual medications, including aspirin or ibuprofen.    Common Things to Expect  -  Compression must be worn for the first 24 hours and then during the day for 5-7 days.    -  If larger veins are treated with ultrasound-guided sclerotherapy, you will have redness, firmness, tenderness, and swelling.  This firmness and tenderness may take 3-6 months to resolve. Ibuprofen and compression hose will aid in this process.    -  You will have bruising that can last up to 3 weeks. Most fading of the veins will occur between 3 and 6 weeks after treatment.    -  You may notice brown discoloration (hyperpigmentation) at the treatment site.  This should fade with time, but will take 3 months to 1 year to fully heal.     -  Some treated  veins may look darker because of trapped blood within the vein. This trapped blood can be removed at a minimum of 1 month following treatment. Larger veins are more likely to develop trapped blood.    -  It is very important for you to use at least SPF 30 sunscreen in order to help prevent the discoloration of your skin.    -  Migraines rarely occur following sclerotherapy, but are more likely in patients with a history of migraines.  Treat as you would any other migraine.  SCLEROTHERAPY AFTER CARE  Immediately:  After treatment, walk for 10-15 minutes before getting in your car.  If your trip home is more than 1 hour, stop and walk around for 5-10 minutes. Avoid sitting or standing for extended periods.   First 24 hours: Wear your compression continuously, even while in bed. After the 24 hours, you may shower if you want to. Put your hose back on, unless you are going to bed. You should NOT wear compression to bed after the first 24 hours. You may fly the next day, but wear your compression.   For 5 days: Wear the compression hose for waking hours only. You may continue to wear them longer than 5 days if you prefer.   For days 5-7: Walking is encouraged, as it promotes efficient circulation in your veins. You may do activities that raise your heart rate, but do NOT run, jog, do high impact aerobics, or weight lifting. After 7 days, no activity restrictions.    Shaving: Wait a few days to shave or apply lotion.   Bathing: Do NOT take hot baths or sit in a hot tub for 7-10 days.    For 1 year: Wear SPF 30 sunscreen on your legs when in the sun. This is very important! It helps prevent darkening of the skin at the injection sites.   Medications: You may resume your usual medications, including aspirin or ibuprofen.    Common Things to Expect  -  Compression must be worn for the first 24 hours and then during the day for 5-7 days.    -  If larger veins are treated with ultrasound-guided sclerotherapy, you will have  redness, firmness, tenderness, and swelling.  This firmness and tenderness may take 3-6 months to resolve. Ibuprofen and compression hose will aid in this process.    -  You will have bruising that can last up to 3 weeks. Most fading of the veins will occur between 3 and 6 weeks after treatment.    -  You may notice brown discoloration (hyperpigmentation) at the treatment site.  This should fade with time, but will take 3 months to 1 year to fully heal.     -  Some treated veins may look darker because of trapped blood within the vein. This trapped blood can be removed at a minimum of 1 month following treatment. Larger veins are more likely to develop trapped blood.    -  It is very important for you to use at least SPF 30 sunscreen in order to help prevent the discoloration of your skin.    -  Migraines rarely occur following sclerotherapy, but are more likely in patients with a history of migraines.  Treat as you would any other migraine.

## 2022-01-02 ENCOUNTER — HEALTH MAINTENANCE LETTER (OUTPATIENT)
Age: 52
End: 2022-01-02

## 2022-02-08 ENCOUNTER — OFFICE VISIT (OUTPATIENT)
Dept: VASCULAR SURGERY | Facility: CLINIC | Age: 52
End: 2022-02-08
Payer: COMMERCIAL

## 2022-02-08 DIAGNOSIS — Z09 SURGICAL FOLLOW-UP CARE: Primary | ICD-10-CM

## 2022-02-08 PROCEDURE — 99207 PR VEINSOLUTIONS POST OPERATIVE VISIT: CPT | Performed by: SURGERY

## 2022-02-08 RX ORDER — ESTRADIOL 0.1 MG/D
FILM, EXTENDED RELEASE TRANSDERMAL
COMMUNITY
Start: 2022-01-25

## 2022-02-08 RX ORDER — CHOLECALCIFEROL (VITAMIN D3) 50 MCG
TABLET ORAL
COMMUNITY

## 2022-02-08 NOTE — LETTER
2/8/2022         RE: Debi Burciaga  [address] [facility] [address]        Dear Colleague,    Thank you for referring your patient, Debi Burciaga, to the [facility] [place]. Please see a copy of my visit note below.    Debi Burciaga returns 2 months status post her second round of cosmetic injection sclerotherapy.  Overall she is very pleased with her results.  She has a small cluster of spider telangiectasias with some thrombus in the anterolateral aspect of the mid right calf.  This area is not tender and is improving.  At today's visit she had no complaints.  She did not wish to pursue any further treatment today.    On exam her legs look very good.  There is the one dime sized area of thrombosed spider telangiectasias on the mid anterolateral right calf.  No other significant spider or reticular veins.    ASSESSMENT:  Status post 2 rounds of cosmetic injection sclerotherapy overall she is very pleased with the results.    RECOMMENDATION:  I reviewed the above with Debi.  She had no questions or concerns.  Follow-up will be with me on an as-needed basis for future cosmetic sclerotherapy.    Khurram Jack MD        Again, thank you for allowing me to participate in the care of your patient.        Sincerely,        Henry Jack MD

## 2022-02-08 NOTE — PROGRESS NOTES
Debi Burciaga returns 2 months status post her second round of cosmetic injection sclerotherapy.  Overall she is very pleased with her results.  She has a small cluster of spider telangiectasias with some thrombus in the anterolateral aspect of the mid right calf.  This area is not tender and is improving.  At today's visit she had no complaints.  She did not wish to pursue any further treatment today.    On exam her legs look very good.  There is the one dime sized area of thrombosed spider telangiectasias on the mid anterolateral right calf.  No other significant spider or reticular veins.    ASSESSMENT:  Status post 2 rounds of cosmetic injection sclerotherapy overall she is very pleased with the results.    RECOMMENDATION:  I reviewed the above with Debi.  She had no questions or concerns.  Follow-up will be with me on an as-needed basis for future cosmetic sclerotherapy.    Khurram Jack MD

## 2022-11-02 ENCOUNTER — OFFICE VISIT (OUTPATIENT)
Dept: VASCULAR SURGERY | Facility: CLINIC | Age: 52
End: 2022-11-02
Payer: COMMERCIAL

## 2022-11-02 DIAGNOSIS — I78.1 ASYMPTOMATIC SPIDER VEINS OF BOTH LOWER EXTREMITIES: ICD-10-CM

## 2022-11-02 DIAGNOSIS — Z09 SURGICAL FOLLOW-UP CARE: Primary | ICD-10-CM

## 2022-11-02 PROCEDURE — 99207 PR VEINSOLUTIONS POST OPERATIVE VISIT: CPT | Performed by: SURGERY

## 2022-11-02 NOTE — LETTER
11/2/2022         RE: Debi Burciaga  [address] [facility] [id number]        Dear Colleague,    Thank you for referring your patient, Debi Burciaga, to the [facility] [place]. Please see a copy of my visit note below.    Debi Burciaga is a healthy 52-year-old female who underwent her first round of cosmetic injection sclerotherapy for bilateral lower extremity spider veins in November 2021.  She had a second limited round of cosmetic sclerotherapy 6 weeks later.  She returned for a scleral recheck on 2/8/2022 at which time she was overall very pleased with the improved appearance.  She did have 1 dime sized area on the anterolateral aspect of her mid right calf that had some thrombosed, asymptomatic spider veins.  She did not have a third treatment at that time.  She returns today for additional sclerotherapy follow-up.  She has developed some hyperpigmentation in that affected area on the anterolateral aspect of the mid right calf.  She is wondering what can be done to improve the appearance of this hyperpigmentation.  She had a few other small areas of hyperpigmentation on the lateral thighs.  Apart from this she had no complaints.    We did not do a sclerotherapy treatment today.    ASSESSMENT:  Status post 2 rounds of cosmetic injection sclerotherapy with her last treatment on 12/28/2021.  Overall there has been improved cosmesis.  She developed a dime sized area of thrombosed spider veins on the anterolateral aspect of the mid right calf.  She has subsequently developed some hyperpigmentation in this area with some scattered small areas of hyperpigmentation on the lateral thighs.  Overall she remains satisfied with improved cosmesis.    PLAN:  As indicated we did not do a third treatment today.  I cautioned Debi that additional sclerotherapy would be at some increased risk for additional hyperpigmentation given her history.  In terms of treatment for the  hyperpigmented areas, the gold standard remains Hydroquinone.  I offered her a prescription for this cream.  She politely declines.  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  She believes that they have used this laser treatment successfully on other areas of hyperpigmentation.  I have no personal experience with this.  Laser therapy is suggested as a possible treatment for hyperpigmentation.  She plans to pursue this on her own through her place of employment.  Follow-up will be with us on an as-needed basis for additional cosmetic sclerotherapy if she wishes.    All of her questions were answered and she verbalizes full understanding to the above and agreement with this plan.    Khurram Jack MD      Again, thank you for allowing me to participate in the care of your patient.        Sincerely,        Henry Jack MD

## 2022-11-19 ENCOUNTER — HEALTH MAINTENANCE LETTER (OUTPATIENT)
Age: 52
End: 2022-11-19

## 2023-02-14 NOTE — PROGRESS NOTES
Debi Burciaga is a healthy 52-year-old female who underwent her first round of cosmetic injection sclerotherapy for bilateral lower extremity spider veins in November 2021.  She had a second limited round of cosmetic sclerotherapy 6 weeks later.  She returned for a scleral recheck on 2/8/2022 at which time she was overall very pleased with the improved appearance.  She did have 1 dime sized area on the anterolateral aspect of her mid right calf that had some thrombosed, asymptomatic spider veins.  She did not have a third treatment at that time.  She returns today for additional sclerotherapy follow-up.  She has developed some hyperpigmentation in that affected area on the anterolateral aspect of the mid right calf.  She is wondering what can be done to improve the appearance of this hyperpigmentation.  She had a few other small areas of hyperpigmentation on the lateral thighs.  Apart from this she had no complaints.    We did not do a sclerotherapy treatment today.    ASSESSMENT:  Status post 2 rounds of cosmetic injection sclerotherapy with her last treatment on 12/28/2021.  Overall there has been improved cosmesis.  She developed a dime sized area of thrombosed spider veins on the anterolateral aspect of the mid right calf.  She has subsequently developed some hyperpigmentation in this area with some scattered small areas of hyperpigmentation on the lateral thighs.  Overall she remains satisfied with improved cosmesis.    PLAN:  As indicated we did not do a third treatment today.  I cautioned Debi that additional sclerotherapy would be at some increased risk for additional hyperpigmentation given her history.  In terms of treatment for the hyperpigmented areas, the gold standard remains Hydroquinone.  I offered her a prescription for this cream.  She politely declines.  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  She  believes that they have used this laser treatment successfully on other areas of hyperpigmentation.  I have no personal experience with this.  Laser therapy is suggested as a possible treatment for hyperpigmentation.  She plans to pursue this on her own through her place of employment.  Follow-up will be with us on an as-needed basis for additional cosmetic sclerotherapy if she wishes.    All of her questions were answered and she verbalizes full understanding to the above and agreement with this plan.    Khurram Jack MD

## 2023-04-09 ENCOUNTER — HEALTH MAINTENANCE LETTER (OUTPATIENT)
Age: 53
End: 2023-04-09

## 2023-05-09 ENCOUNTER — LAB REQUISITION (OUTPATIENT)
Dept: LAB | Facility: CLINIC | Age: 53
End: 2023-05-09
Payer: COMMERCIAL

## 2023-05-09 DIAGNOSIS — R87.619 UNSPECIFIED ABNORMAL CYTOLOGICAL FINDINGS IN SPECIMENS FROM CERVIX UTERI: ICD-10-CM

## 2023-05-09 PROCEDURE — 88305 TISSUE EXAM BY PATHOLOGIST: CPT | Mod: 26 | Performed by: PATHOLOGY

## 2023-05-09 PROCEDURE — 88305 TISSUE EXAM BY PATHOLOGIST: CPT | Mod: TC,ORL | Performed by: OBSTETRICS & GYNECOLOGY

## 2023-05-12 LAB
PATH REPORT.COMMENTS IMP SPEC: NORMAL
PATH REPORT.COMMENTS IMP SPEC: NORMAL
PATH REPORT.FINAL DX SPEC: NORMAL
PATH REPORT.GROSS SPEC: NORMAL
PATH REPORT.MICROSCOPIC SPEC OTHER STN: NORMAL
PATH REPORT.RELEVANT HX SPEC: NORMAL
PHOTO IMAGE: NORMAL

## 2024-06-11 ENCOUNTER — LAB REQUISITION (OUTPATIENT)
Dept: LAB | Facility: CLINIC | Age: 54
End: 2024-06-11

## 2024-06-11 DIAGNOSIS — Z01.419 ENCOUNTER FOR GYNECOLOGICAL EXAMINATION (GENERAL) (ROUTINE) WITHOUT ABNORMAL FINDINGS: ICD-10-CM

## 2024-06-11 PROCEDURE — 87624 HPV HI-RISK TYP POOLED RSLT: CPT | Performed by: OBSTETRICS & GYNECOLOGY

## 2024-06-11 PROCEDURE — G0145 SCR C/V CYTO,THINLAYER,RESCR: HCPCS | Performed by: OBSTETRICS & GYNECOLOGY

## 2024-06-12 LAB
HPV HR 12 DNA CVX QL NAA+PROBE: NEGATIVE
HPV16 DNA CVX QL NAA+PROBE: NEGATIVE
HPV18 DNA CVX QL NAA+PROBE: NEGATIVE
HUMAN PAPILLOMA VIRUS FINAL DIAGNOSIS: NORMAL

## 2024-06-15 ENCOUNTER — HEALTH MAINTENANCE LETTER (OUTPATIENT)
Age: 54
End: 2024-06-15

## 2024-06-15 LAB
BKR LAB AP GYN ADEQUACY: NORMAL
BKR LAB AP GYN INTERPRETATION: NORMAL
PATH REPORT.COMMENTS IMP SPEC: NORMAL
PATH REPORT.COMMENTS IMP SPEC: NORMAL

## 2025-04-13 ENCOUNTER — HEALTH MAINTENANCE LETTER (OUTPATIENT)
Age: 55
End: 2025-04-13

## 2025-07-06 ENCOUNTER — HEALTH MAINTENANCE LETTER (OUTPATIENT)
Age: 55
End: 2025-07-06

## (undated) DEVICE — SUCTION CANISTER MEDIVAC LINER 3000ML W/LID 65651-530

## (undated) DEVICE — SU DERMABOND MINI DHVM12

## (undated) DEVICE — ESU CORD MONOPOLAR 10'  E0510

## (undated) DEVICE — EVAC SYSTEM CLEAR FLOW SC082500

## (undated) DEVICE — SOL NACL 0.9% IRRIG 1000ML BOTTLE 2F7124

## (undated) DEVICE — GLOVE PROTEXIS MICRO 7.0  2D73PM70

## (undated) DEVICE — ENDO SCOPE WARMER LF TM500

## (undated) DEVICE — ENDO TROCAR FIRST ENTRY KII FIOS Z-THRD 11X100MM CTF33

## (undated) DEVICE — LINEN TOWEL PACK X5 5464

## (undated) DEVICE — PREP DURAPREP 26ML APL 8630

## (undated) DEVICE — SU VICRYL 0 UR-6 27" J603H

## (undated) DEVICE — SOL WATER IRRIG 1000ML BOTTLE 2F7114

## (undated) DEVICE — GLOVE PROTEXIS POWDER FREE 6.5 ORTHOPEDIC 2D73ET65

## (undated) DEVICE — SOL NACL 0.9% INJ 1000ML BAG 2B1324X

## (undated) DEVICE — ESU HOLDER LAP INST DISP PURPLE LONG 330MM H-PRO-330

## (undated) DEVICE — Device

## (undated) DEVICE — ENDO TROCAR FIRST ENTRY KII FIOS Z-THRD 05X100MM CTF03

## (undated) DEVICE — SUTURE PASSER 12" 72201406

## (undated) DEVICE — ENDO POUCH UNIV RETRIEVAL SYSTEM INZII 10MM CD001

## (undated) DEVICE — ESU HANDPIECE BIPOLAR THUNDERBEAT FC 5MMX35CM TB-0535FC

## (undated) DEVICE — TUBING C02 INSUFFLATION LAP FILTER HEATER 6198

## (undated) DEVICE — PANTIES MESH LG/XLG 2PK 706M2

## (undated) DEVICE — SU VICRYL 4-0 PS-2 18" UND J496H

## (undated) DEVICE — SUCTION IRR STRYKERFLOW II W/TIP 250-070-520

## (undated) RX ORDER — DEXAMETHASONE SODIUM PHOSPHATE 4 MG/ML
INJECTION, SOLUTION INTRA-ARTICULAR; INTRALESIONAL; INTRAMUSCULAR; INTRAVENOUS; SOFT TISSUE
Status: DISPENSED
Start: 2019-07-29

## (undated) RX ORDER — LIDOCAINE HYDROCHLORIDE 20 MG/ML
INJECTION, SOLUTION EPIDURAL; INFILTRATION; INTRACAUDAL; PERINEURAL
Status: DISPENSED
Start: 2019-07-29

## (undated) RX ORDER — PROPOFOL 10 MG/ML
INJECTION, EMULSION INTRAVENOUS
Status: DISPENSED
Start: 2019-07-29

## (undated) RX ORDER — CEFAZOLIN SODIUM 1 G/3ML
INJECTION, POWDER, FOR SOLUTION INTRAMUSCULAR; INTRAVENOUS
Status: DISPENSED
Start: 2019-07-29

## (undated) RX ORDER — GLYCOPYRROLATE 0.2 MG/ML
INJECTION, SOLUTION INTRAMUSCULAR; INTRAVENOUS
Status: DISPENSED
Start: 2019-07-29

## (undated) RX ORDER — NEOSTIGMINE METHYLSULFATE 1 MG/ML
VIAL (ML) INJECTION
Status: DISPENSED
Start: 2019-07-29

## (undated) RX ORDER — VECURONIUM BROMIDE 1 MG/ML
INJECTION, POWDER, LYOPHILIZED, FOR SOLUTION INTRAVENOUS
Status: DISPENSED
Start: 2019-07-29

## (undated) RX ORDER — ONDANSETRON 2 MG/ML
INJECTION INTRAMUSCULAR; INTRAVENOUS
Status: DISPENSED
Start: 2019-07-29

## (undated) RX ORDER — FENTANYL CITRATE 50 UG/ML
INJECTION, SOLUTION INTRAMUSCULAR; INTRAVENOUS
Status: DISPENSED
Start: 2019-07-29